# Patient Record
Sex: MALE | Race: WHITE | NOT HISPANIC OR LATINO | Employment: OTHER | URBAN - METROPOLITAN AREA
[De-identification: names, ages, dates, MRNs, and addresses within clinical notes are randomized per-mention and may not be internally consistent; named-entity substitution may affect disease eponyms.]

---

## 2017-03-16 ENCOUNTER — HISTORICAL (OUTPATIENT)
Dept: HEMATOLOGY/ONCOLOGY | Facility: CLINIC | Age: 67
End: 2017-03-16

## 2017-06-27 ENCOUNTER — HISTORICAL (OUTPATIENT)
Dept: ENDOSCOPY | Facility: HOSPITAL | Age: 67
End: 2017-06-27

## 2017-06-27 LAB — CRC RECOMMENDATION EXT: NORMAL

## 2017-09-19 ENCOUNTER — HISTORICAL (OUTPATIENT)
Dept: HEMATOLOGY/ONCOLOGY | Facility: CLINIC | Age: 67
End: 2017-09-19

## 2017-09-19 LAB
ABS NEUT (OLG): 3.39 X10(3)/MCL (ref 2.1–9.2)
ALBUMIN SERPL-MCNC: 3.7 GM/DL (ref 3.4–5)
ALBUMIN/GLOB SERPL: 0.9 RATIO (ref 1.1–2)
ALP SERPL-CCNC: 43 UNIT/L (ref 50–136)
ALT SERPL-CCNC: 28 UNIT/L (ref 12–78)
AST SERPL-CCNC: 21 UNIT/L (ref 15–37)
BASOPHILS # BLD AUTO: 0 X10(3)/MCL (ref 0–0.2)
BASOPHILS NFR BLD AUTO: 0.5 %
BILIRUB SERPL-MCNC: 0.5 MG/DL (ref 0.2–1)
BILIRUBIN DIRECT+TOT PNL SERPL-MCNC: 0.1 MG/DL (ref 0–0.5)
BILIRUBIN DIRECT+TOT PNL SERPL-MCNC: 0.4 MG/DL (ref 0–0.8)
BUN SERPL-MCNC: 25 MG/DL (ref 7–18)
CALCIUM SERPL-MCNC: 10.1 MG/DL (ref 8.5–10.1)
CHLORIDE SERPL-SCNC: 108 MMOL/L (ref 98–107)
CO2 SERPL-SCNC: 24 MMOL/L (ref 21–32)
CREAT SERPL-MCNC: 1.21 MG/DL (ref 0.7–1.3)
EOSINOPHIL # BLD AUTO: 0.1 X10(3)/MCL (ref 0–0.9)
EOSINOPHIL NFR BLD AUTO: 2 %
ERYTHROCYTE [DISTWIDTH] IN BLOOD BY AUTOMATED COUNT: 14 % (ref 11.5–17)
GLOBULIN SER-MCNC: 4 GM/DL (ref 2.4–3.5)
GLUCOSE SERPL-MCNC: 78 MG/DL (ref 74–106)
HCT VFR BLD AUTO: 39.3 % (ref 42–52)
HGB BLD-MCNC: 12.5 GM/DL (ref 14–18)
LYMPHOCYTES # BLD AUTO: 1.6 X10(3)/MCL (ref 0.6–4.6)
LYMPHOCYTES NFR BLD AUTO: 28 %
MCH RBC QN AUTO: 29.6 PG (ref 27–31)
MCHC RBC AUTO-ENTMCNC: 31.8 GM/DL (ref 33–36)
MCV RBC AUTO: 93.1 FL (ref 80–94)
MONOCYTES # BLD AUTO: 0.5 X10(3)/MCL (ref 0.1–1.3)
MONOCYTES NFR BLD AUTO: 8.3 %
NEUTROPHILS # BLD AUTO: 3.4 X10(3)/MCL (ref 2.1–9.2)
NEUTROPHILS NFR BLD AUTO: 61.2 %
PLATELET # BLD AUTO: 192 X10(3)/MCL (ref 130–400)
PMV BLD AUTO: 9.7 FL (ref 9.4–12.4)
POTASSIUM SERPL-SCNC: 5 MMOL/L (ref 3.5–5.1)
PROT SERPL-MCNC: 7.6 GM/DL
PROT SERPL-MCNC: 7.7 GM/DL (ref 6.4–8.2)
RBC # BLD AUTO: 4.22 X10(6)/MCL (ref 4.7–6.1)
SODIUM SERPL-SCNC: 143 MMOL/L (ref 136–145)
WBC # SPEC AUTO: 5.5 X10(3)/MCL (ref 4.5–11.5)

## 2017-10-06 ENCOUNTER — HISTORICAL (OUTPATIENT)
Dept: RADIOLOGY | Facility: HOSPITAL | Age: 67
End: 2017-10-06

## 2017-11-09 ENCOUNTER — HISTORICAL (OUTPATIENT)
Dept: RADIOLOGY | Facility: HOSPITAL | Age: 67
End: 2017-11-09

## 2017-11-09 LAB — POC CREATININE: 1.1 MG/DL (ref 0.6–1.3)

## 2017-12-22 ENCOUNTER — HISTORICAL (OUTPATIENT)
Dept: HEMATOLOGY/ONCOLOGY | Facility: CLINIC | Age: 67
End: 2017-12-22

## 2017-12-22 LAB
ABS NEUT (OLG): 3.21 X10(3)/MCL (ref 2.1–9.2)
ALBUMIN SERPL-MCNC: 3.8 GM/DL (ref 3.4–5)
ALBUMIN/GLOB SERPL: 1 {RATIO}
ALP SERPL-CCNC: 41 UNIT/L (ref 50–136)
ALT SERPL-CCNC: 26 UNIT/L (ref 12–78)
AST SERPL-CCNC: 16 UNIT/L (ref 15–37)
BASOPHILS # BLD AUTO: 0 X10(3)/MCL (ref 0–0.2)
BASOPHILS NFR BLD AUTO: 0.5 %
BILIRUB SERPL-MCNC: 0.4 MG/DL (ref 0.2–1)
BILIRUBIN DIRECT+TOT PNL SERPL-MCNC: 0.1 MG/DL (ref 0–0.2)
BILIRUBIN DIRECT+TOT PNL SERPL-MCNC: 0.3 MG/DL (ref 0–0.8)
BUN SERPL-MCNC: 27 MG/DL (ref 7–18)
CALCIUM SERPL-MCNC: 9.7 MG/DL (ref 8.5–10.1)
CHLORIDE SERPL-SCNC: 106 MMOL/L (ref 98–107)
CO2 SERPL-SCNC: 28 MMOL/L (ref 21–32)
CREAT SERPL-MCNC: 1.15 MG/DL (ref 0.7–1.3)
EOSINOPHIL # BLD AUTO: 0.1 X10(3)/MCL (ref 0–0.9)
EOSINOPHIL NFR BLD AUTO: 2.4 %
ERYTHROCYTE [DISTWIDTH] IN BLOOD BY AUTOMATED COUNT: 13.3 % (ref 11.5–17)
GLOBULIN SER-MCNC: 4 GM/DL (ref 2.4–3.5)
GLUCOSE SERPL-MCNC: 75 MG/DL (ref 74–106)
HCT VFR BLD AUTO: 38 % (ref 42–52)
HGB BLD-MCNC: 12.2 GM/DL (ref 14–18)
LYMPHOCYTES # BLD AUTO: 1.8 X10(3)/MCL (ref 0.6–4.6)
LYMPHOCYTES NFR BLD AUTO: 31.8 %
MCH RBC QN AUTO: 30 PG (ref 27–31)
MCHC RBC AUTO-ENTMCNC: 32.1 GM/DL (ref 33–36)
MCV RBC AUTO: 93.4 FL (ref 80–94)
MONOCYTES # BLD AUTO: 0.5 X10(3)/MCL (ref 0.1–1.3)
MONOCYTES NFR BLD AUTO: 9.1 %
NEUTROPHILS # BLD AUTO: 3.2 X10(3)/MCL (ref 2.1–9.2)
NEUTROPHILS NFR BLD AUTO: 56.2 %
PLATELET # BLD AUTO: 194 X10(3)/MCL (ref 130–400)
PMV BLD AUTO: 9.1 FL (ref 9.4–12.4)
POTASSIUM SERPL-SCNC: 4.9 MMOL/L (ref 3.5–5.1)
PROT SERPL-MCNC: 7.4 GM/DL
PROT SERPL-MCNC: 7.8 GM/DL (ref 6.4–8.2)
RBC # BLD AUTO: 4.07 X10(6)/MCL (ref 4.7–6.1)
SODIUM SERPL-SCNC: 141 MMOL/L (ref 136–145)
WBC # SPEC AUTO: 5.7 X10(3)/MCL (ref 4.5–11.5)

## 2018-05-15 ENCOUNTER — HISTORICAL (OUTPATIENT)
Dept: HEMATOLOGY/ONCOLOGY | Facility: CLINIC | Age: 68
End: 2018-05-15

## 2018-05-15 LAB
ABS NEUT (OLG): 3.41 X10(3)/MCL (ref 2.1–9.2)
ALBUMIN SERPL-MCNC: 3.6 GM/DL (ref 3.4–5)
ALBUMIN/GLOB SERPL: 1 RATIO (ref 1.1–2)
ALP SERPL-CCNC: 49 UNIT/L (ref 50–136)
ALT SERPL-CCNC: 29 UNIT/L (ref 12–78)
AST SERPL-CCNC: 20 UNIT/L (ref 15–37)
BASOPHILS # BLD AUTO: 0.1 X10(3)/MCL (ref 0–0.2)
BASOPHILS NFR BLD AUTO: 1.1 %
BILIRUB SERPL-MCNC: 0.5 MG/DL (ref 0.2–1)
BILIRUBIN DIRECT+TOT PNL SERPL-MCNC: 0.1 MG/DL (ref 0–0.5)
BILIRUBIN DIRECT+TOT PNL SERPL-MCNC: 0.4 MG/DL (ref 0–0.8)
BUN SERPL-MCNC: 23 MG/DL (ref 7–18)
CALCIUM SERPL-MCNC: 9.8 MG/DL (ref 8.5–10.1)
CHLORIDE SERPL-SCNC: 104 MMOL/L (ref 98–107)
CO2 SERPL-SCNC: 26 MMOL/L (ref 21–32)
CREAT SERPL-MCNC: 1.02 MG/DL (ref 0.7–1.3)
EOSINOPHIL # BLD AUTO: 0.2 X10(3)/MCL (ref 0–0.9)
EOSINOPHIL NFR BLD AUTO: 2.8 %
ERYTHROCYTE [DISTWIDTH] IN BLOOD BY AUTOMATED COUNT: 13.5 % (ref 11.5–17)
GLOBULIN SER-MCNC: 3.5 GM/DL (ref 2.4–3.5)
GLUCOSE SERPL-MCNC: 77 MG/DL (ref 74–106)
HCT VFR BLD AUTO: 35.7 % (ref 42–52)
HGB BLD-MCNC: 11.6 GM/DL (ref 14–18)
LYMPHOCYTES # BLD AUTO: 1.6 X10(3)/MCL (ref 0.6–4.6)
LYMPHOCYTES NFR BLD AUTO: 27.7 %
MCH RBC QN AUTO: 30.2 PG (ref 27–31)
MCHC RBC AUTO-ENTMCNC: 32.5 GM/DL (ref 33–36)
MCV RBC AUTO: 93 FL (ref 80–94)
MONOCYTES # BLD AUTO: 0.4 X10(3)/MCL (ref 0.1–1.3)
MONOCYTES NFR BLD AUTO: 7.8 %
NEUTROPHILS # BLD AUTO: 3.4 X10(3)/MCL (ref 2.1–9.2)
NEUTROPHILS NFR BLD AUTO: 60.6 %
PLATELET # BLD AUTO: 205 X10(3)/MCL (ref 130–400)
PMV BLD AUTO: 9.3 FL (ref 9.4–12.4)
POTASSIUM SERPL-SCNC: 4.5 MMOL/L (ref 3.5–5.1)
PROT SERPL-MCNC: 7.1 G/DL
PROT SERPL-MCNC: 7.1 GM/DL (ref 6.4–8.2)
RBC # BLD AUTO: 3.84 X10(6)/MCL (ref 4.7–6.1)
SODIUM SERPL-SCNC: 138 MMOL/L (ref 136–145)
WBC # SPEC AUTO: 5.6 X10(3)/MCL (ref 4.5–11.5)

## 2018-05-24 ENCOUNTER — HISTORICAL (OUTPATIENT)
Dept: ADMINISTRATIVE | Facility: HOSPITAL | Age: 68
End: 2018-05-24

## 2018-05-24 LAB
ABS NEUT (OLG): 3.22 X10(3)/MCL (ref 2.1–9.2)
ALBUMIN SERPL-MCNC: 3.6 GM/DL (ref 3.4–5)
ALBUMIN/GLOB SERPL: 1 RATIO (ref 1.1–2)
ALP SERPL-CCNC: 48 UNIT/L (ref 50–136)
ALT SERPL-CCNC: 25 UNIT/L (ref 12–78)
AST SERPL-CCNC: 16 UNIT/L (ref 15–37)
BASOPHILS # BLD AUTO: 0 X10(3)/MCL (ref 0–0.2)
BASOPHILS NFR BLD AUTO: 0.8 %
BILIRUB SERPL-MCNC: 0.3 MG/DL (ref 0.2–1)
BILIRUBIN DIRECT+TOT PNL SERPL-MCNC: 0.1 MG/DL (ref 0–0.5)
BILIRUBIN DIRECT+TOT PNL SERPL-MCNC: 0.2 MG/DL (ref 0–0.8)
BUN SERPL-MCNC: 34 MG/DL (ref 7–18)
CALCIUM SERPL-MCNC: 9.5 MG/DL (ref 8.5–10.1)
CHLORIDE SERPL-SCNC: 107 MMOL/L (ref 98–107)
CO2 SERPL-SCNC: 23 MMOL/L (ref 21–32)
CREAT SERPL-MCNC: 1.64 MG/DL (ref 0.7–1.3)
EOSINOPHIL # BLD AUTO: 0.1 X10(3)/MCL (ref 0–0.9)
EOSINOPHIL NFR BLD AUTO: 1.7 %
ERYTHROCYTE [DISTWIDTH] IN BLOOD BY AUTOMATED COUNT: 13.7 % (ref 11.5–17)
FERRITIN SERPL-MCNC: 88 NG/ML (ref 8–388)
FOLATE SERPL-MCNC: 3.6 NG/ML (ref 3.1–17.5)
GLOBULIN SER-MCNC: 3.7 GM/DL (ref 2.4–3.5)
GLUCOSE SERPL-MCNC: 89 MG/DL (ref 74–106)
HCT VFR BLD AUTO: 36.3 % (ref 42–52)
HGB BLD-MCNC: 11.7 GM/DL (ref 14–18)
IRON SATN MFR SERPL: 23.7 % (ref 20–50)
IRON SERPL-MCNC: 89 MCG/DL (ref 50–175)
LYMPHOCYTES # BLD AUTO: 1.5 X10(3)/MCL (ref 0.6–4.6)
LYMPHOCYTES NFR BLD AUTO: 28.9 %
MCH RBC QN AUTO: 30.5 PG (ref 27–31)
MCHC RBC AUTO-ENTMCNC: 32.2 GM/DL (ref 33–36)
MCV RBC AUTO: 94.8 FL (ref 80–94)
MONOCYTES # BLD AUTO: 0.4 X10(3)/MCL (ref 0.1–1.3)
MONOCYTES NFR BLD AUTO: 8.1 %
NEUTROPHILS # BLD AUTO: 3.2 X10(3)/MCL (ref 2.1–9.2)
NEUTROPHILS NFR BLD AUTO: 60.5 %
PLATELET # BLD AUTO: 210 X10(3)/MCL (ref 130–400)
PMV BLD AUTO: 9.8 FL (ref 9.4–12.4)
POTASSIUM SERPL-SCNC: 5 MMOL/L (ref 3.5–5.1)
PROT SERPL-MCNC: 7.3 GM/DL (ref 6.4–8.2)
RBC # BLD AUTO: 3.83 X10(6)/MCL (ref 4.7–6.1)
RET# (OHS): 0.07 X10^6/ML (ref 0.03–0.1)
RETICULOCYTE COUNT AUTOMATED (OLG): 2 % (ref 1.1–2.1)
SODIUM SERPL-SCNC: 140 MMOL/L (ref 136–145)
TIBC SERPL-MCNC: 375 MCG/DL (ref 250–450)
TRANSFERRIN SERPL-MCNC: 315 MG/DL (ref 200–360)
VIT B12 SERPL-MCNC: 310 PG/ML (ref 193–986)
WBC # SPEC AUTO: 5.3 X10(3)/MCL (ref 4.5–11.5)

## 2018-06-11 ENCOUNTER — HISTORICAL (OUTPATIENT)
Dept: LAB | Facility: HOSPITAL | Age: 68
End: 2018-06-11

## 2018-06-11 ENCOUNTER — HISTORICAL (OUTPATIENT)
Dept: ADMINISTRATIVE | Facility: HOSPITAL | Age: 68
End: 2018-06-11

## 2018-06-11 LAB
ABS NEUT (OLG): 3.78 X10(3)/MCL (ref 2.1–9.2)
BASOPHILS # BLD AUTO: 0 X10(3)/MCL (ref 0–0.2)
BASOPHILS NFR BLD AUTO: 1 %
EOSINOPHIL # BLD AUTO: 0.1 X10(3)/MCL (ref 0–0.9)
EOSINOPHIL NFR BLD AUTO: 2 %
ERYTHROCYTE [DISTWIDTH] IN BLOOD BY AUTOMATED COUNT: 13.3 % (ref 11.5–17)
HCT VFR BLD AUTO: 37.4 % (ref 42–52)
HGB BLD-MCNC: 11.7 GM/DL (ref 14–18)
LYMPHOCYTES # BLD AUTO: 1.4 X10(3)/MCL (ref 0.6–4.6)
LYMPHOCYTES NFR BLD AUTO: 24 %
MCH RBC QN AUTO: 29.9 PG (ref 27–31)
MCHC RBC AUTO-ENTMCNC: 31.3 GM/DL (ref 33–36)
MCV RBC AUTO: 95.7 FL (ref 80–94)
MONOCYTES # BLD AUTO: 0.4 X10(3)/MCL (ref 0.1–1.3)
MONOCYTES NFR BLD AUTO: 7 %
NEUTROPHILS # BLD AUTO: 3.78 X10(3)/MCL (ref 2.1–9.2)
NEUTROPHILS NFR BLD AUTO: 66 %
PLATELET # BLD AUTO: 177 X10(3)/MCL (ref 130–400)
PMV BLD AUTO: 10.2 FL (ref 9.4–12.4)
RBC # BLD AUTO: 3.91 X10(6)/MCL (ref 4.7–6.1)
WBC # SPEC AUTO: 5.7 X10(3)/MCL (ref 4.5–11.5)

## 2018-09-25 ENCOUNTER — HISTORICAL (OUTPATIENT)
Dept: HEMATOLOGY/ONCOLOGY | Facility: CLINIC | Age: 68
End: 2018-09-25

## 2018-09-25 LAB
ABS NEUT (OLG): 3.96 X10(3)/MCL (ref 2.1–9.2)
ALBUMIN SERPL-MCNC: 3.7 GM/DL (ref 3.4–5)
ALBUMIN/GLOB SERPL: 0.9 {RATIO}
ALP SERPL-CCNC: 37 UNIT/L (ref 50–136)
ALT SERPL-CCNC: 27 UNIT/L (ref 12–78)
AST SERPL-CCNC: 19 UNIT/L (ref 15–37)
BASOPHILS # BLD AUTO: 0 X10(3)/MCL (ref 0–0.2)
BASOPHILS NFR BLD AUTO: 0.6 %
BILIRUB SERPL-MCNC: 0.3 MG/DL (ref 0.2–1)
BILIRUBIN DIRECT+TOT PNL SERPL-MCNC: 0.1 MG/DL (ref 0–0.2)
BILIRUBIN DIRECT+TOT PNL SERPL-MCNC: 0.2 MG/DL (ref 0–0.8)
BUN SERPL-MCNC: 31 MG/DL (ref 7–18)
CALCIUM SERPL-MCNC: 9.6 MG/DL (ref 8.5–10.1)
CHLORIDE SERPL-SCNC: 108 MMOL/L (ref 98–107)
CO2 SERPL-SCNC: 24 MMOL/L (ref 21–32)
CREAT SERPL-MCNC: 1.37 MG/DL (ref 0.7–1.3)
EOSINOPHIL # BLD AUTO: 0.1 X10(3)/MCL (ref 0–0.9)
EOSINOPHIL NFR BLD AUTO: 1.6 %
ERYTHROCYTE [DISTWIDTH] IN BLOOD BY AUTOMATED COUNT: 13.5 % (ref 11.5–17)
GLOBULIN SER-MCNC: 4 GM/DL (ref 2.4–3.5)
GLUCOSE SERPL-MCNC: 79 MG/DL (ref 74–106)
HCT VFR BLD AUTO: 38.2 % (ref 42–52)
HGB BLD-MCNC: 12.3 GM/DL (ref 14–18)
LYMPHOCYTES # BLD AUTO: 1.8 X10(3)/MCL (ref 0.6–4.6)
LYMPHOCYTES NFR BLD AUTO: 28 %
MCH RBC QN AUTO: 30.5 PG (ref 27–31)
MCHC RBC AUTO-ENTMCNC: 32.2 GM/DL (ref 33–36)
MCV RBC AUTO: 94.8 FL (ref 80–94)
MONOCYTES # BLD AUTO: 0.5 X10(3)/MCL (ref 0.1–1.3)
MONOCYTES NFR BLD AUTO: 7.3 %
NEUTROPHILS # BLD AUTO: 4 X10(3)/MCL (ref 2.1–9.2)
NEUTROPHILS NFR BLD AUTO: 62.5 %
PLATELET # BLD AUTO: 218 X10(3)/MCL (ref 130–400)
PMV BLD AUTO: 10.3 FL (ref 9.4–12.4)
POTASSIUM SERPL-SCNC: 5 MMOL/L (ref 3.5–5.1)
PROT SERPL-MCNC: 7.7 G/DL
PROT SERPL-MCNC: 7.7 GM/DL (ref 6.4–8.2)
RBC # BLD AUTO: 4.03 X10(6)/MCL (ref 4.7–6.1)
SODIUM SERPL-SCNC: 142 MMOL/L (ref 136–145)
WBC # SPEC AUTO: 6.3 X10(3)/MCL (ref 4.5–11.5)

## 2019-01-28 ENCOUNTER — HISTORICAL (OUTPATIENT)
Dept: ADMINISTRATIVE | Facility: HOSPITAL | Age: 69
End: 2019-01-28

## 2019-02-04 ENCOUNTER — HISTORICAL (OUTPATIENT)
Dept: HEMATOLOGY/ONCOLOGY | Facility: CLINIC | Age: 69
End: 2019-02-04

## 2019-02-04 LAB
ABS NEUT (OLG): 3.87 X10(3)/MCL (ref 2.1–9.2)
ALBUMIN SERPL-MCNC: 3.5 GM/DL (ref 3.4–5)
ALBUMIN/GLOB SERPL: 1 {RATIO}
ALP SERPL-CCNC: 41 UNIT/L (ref 50–136)
ALT SERPL-CCNC: 23 UNIT/L (ref 12–78)
AST SERPL-CCNC: 16 UNIT/L (ref 15–37)
BASOPHILS # BLD AUTO: 0 X10(3)/MCL (ref 0–0.2)
BASOPHILS NFR BLD AUTO: 0.8 %
BILIRUB SERPL-MCNC: 0.5 MG/DL (ref 0.2–1)
BILIRUBIN DIRECT+TOT PNL SERPL-MCNC: 0.1 MG/DL (ref 0–0.2)
BILIRUBIN DIRECT+TOT PNL SERPL-MCNC: 0.4 MG/DL (ref 0–0.8)
BUN SERPL-MCNC: 22 MG/DL (ref 7–18)
CALCIUM SERPL-MCNC: 9.5 MG/DL (ref 8.5–10.1)
CHLORIDE SERPL-SCNC: 106 MMOL/L (ref 98–107)
CO2 SERPL-SCNC: 27 MMOL/L (ref 21–32)
CREAT SERPL-MCNC: 1.18 MG/DL (ref 0.7–1.3)
EOSINOPHIL # BLD AUTO: 0.1 X10(3)/MCL (ref 0–0.9)
EOSINOPHIL NFR BLD AUTO: 2 %
ERYTHROCYTE [DISTWIDTH] IN BLOOD BY AUTOMATED COUNT: 13.1 % (ref 11.5–17)
FERRITIN SERPL-MCNC: 91.7 NG/ML (ref 8–388)
FOLATE SERPL-MCNC: 10.3 NG/ML (ref 3.1–17.5)
GLOBULIN SER-MCNC: 3.6 GM/DL (ref 2.4–3.5)
GLUCOSE SERPL-MCNC: 75 MG/DL (ref 74–106)
HCT VFR BLD AUTO: 38.4 % (ref 42–52)
HGB BLD-MCNC: 12 GM/DL (ref 14–18)
IRON SATN MFR SERPL: 22.4 % (ref 20–50)
IRON SERPL-MCNC: 88 MCG/DL (ref 50–175)
LYMPHOCYTES # BLD AUTO: 1.4 X10(3)/MCL (ref 0.6–4.6)
LYMPHOCYTES NFR BLD AUTO: 23.8 %
MCH RBC QN AUTO: 29.5 PG (ref 27–31)
MCHC RBC AUTO-ENTMCNC: 31.3 GM/DL (ref 33–36)
MCV RBC AUTO: 94.3 FL (ref 80–94)
MONOCYTES # BLD AUTO: 0.5 X10(3)/MCL (ref 0.1–1.3)
MONOCYTES NFR BLD AUTO: 8.9 %
NEUTROPHILS # BLD AUTO: 3.9 X10(3)/MCL (ref 2.1–9.2)
NEUTROPHILS NFR BLD AUTO: 64.2 %
PLATELET # BLD AUTO: 208 X10(3)/MCL (ref 130–400)
PMV BLD AUTO: 9.9 FL (ref 9.4–12.4)
POTASSIUM SERPL-SCNC: 4.9 MMOL/L (ref 3.5–5.1)
PROT SERPL-MCNC: 7.1 GM/DL
PROT SERPL-MCNC: 7.1 GM/DL (ref 6.4–8.2)
RBC # BLD AUTO: 4.07 X10(6)/MCL (ref 4.7–6.1)
SODIUM SERPL-SCNC: 140 MMOL/L (ref 136–145)
TIBC SERPL-MCNC: 392 MCG/DL (ref 250–450)
TRANSFERRIN SERPL-MCNC: 298 MG/DL (ref 200–360)
VIT B12 SERPL-MCNC: 302 PG/ML (ref 193–986)
WBC # SPEC AUTO: 6 X10(3)/MCL (ref 4.5–11.5)

## 2019-08-05 ENCOUNTER — HISTORICAL (OUTPATIENT)
Dept: HEMATOLOGY/ONCOLOGY | Facility: CLINIC | Age: 69
End: 2019-08-05

## 2019-08-05 LAB
ABS NEUT (OLG): 2.96 X10(3)/MCL (ref 2.1–9.2)
ALBUMIN SERPL-MCNC: 3.6 GM/DL (ref 3.4–5)
ALBUMIN/GLOB SERPL: 0.9 RATIO (ref 1.1–2)
ALP SERPL-CCNC: 42 UNIT/L (ref 50–136)
ALT SERPL-CCNC: 21 UNIT/L (ref 12–78)
AST SERPL-CCNC: 17 UNIT/L (ref 15–37)
BASOPHILS # BLD AUTO: 0 X10(3)/MCL (ref 0–0.2)
BASOPHILS NFR BLD AUTO: 0.8 %
BILIRUB SERPL-MCNC: 0.4 MG/DL (ref 0.2–1)
BILIRUBIN DIRECT+TOT PNL SERPL-MCNC: 0.1 MG/DL (ref 0–0.5)
BILIRUBIN DIRECT+TOT PNL SERPL-MCNC: 0.3 MG/DL (ref 0–0.8)
BUN SERPL-MCNC: 32 MG/DL (ref 7–18)
CALCIUM SERPL-MCNC: 9.9 MG/DL (ref 8.5–10.1)
CHLORIDE SERPL-SCNC: 108 MMOL/L (ref 98–107)
CO2 SERPL-SCNC: 26 MMOL/L (ref 21–32)
CREAT SERPL-MCNC: 1.34 MG/DL (ref 0.7–1.3)
EOSINOPHIL # BLD AUTO: 0.1 X10(3)/MCL (ref 0–0.9)
EOSINOPHIL NFR BLD AUTO: 2.2 %
ERYTHROCYTE [DISTWIDTH] IN BLOOD BY AUTOMATED COUNT: 13 % (ref 11.5–17)
GLOBULIN SER-MCNC: 3.9 GM/DL (ref 2.4–3.5)
GLUCOSE SERPL-MCNC: 78 MG/DL (ref 74–106)
HCT VFR BLD AUTO: 38.1 % (ref 42–52)
HGB BLD-MCNC: 12.1 GM/DL (ref 14–18)
LYMPHOCYTES # BLD AUTO: 1.5 X10(3)/MCL (ref 0.6–4.6)
LYMPHOCYTES NFR BLD AUTO: 29.4 %
MCH RBC QN AUTO: 29.8 PG (ref 27–31)
MCHC RBC AUTO-ENTMCNC: 31.8 GM/DL (ref 33–36)
MCV RBC AUTO: 93.8 FL (ref 80–94)
MONOCYTES # BLD AUTO: 0.4 X10(3)/MCL (ref 0.1–1.3)
MONOCYTES NFR BLD AUTO: 8.4 %
NEUTROPHILS # BLD AUTO: 3 X10(3)/MCL (ref 2.1–9.2)
NEUTROPHILS NFR BLD AUTO: 59.2 %
PLATELET # BLD AUTO: 207 X10(3)/MCL (ref 130–400)
PMV BLD AUTO: 9.6 FL (ref 9.4–12.4)
POTASSIUM SERPL-SCNC: 5 MMOL/L (ref 3.5–5.1)
PROT SERPL-MCNC: 7.3 GM/DL
PROT SERPL-MCNC: 7.5 GM/DL (ref 6.4–8.2)
RBC # BLD AUTO: 4.06 X10(6)/MCL (ref 4.7–6.1)
SODIUM SERPL-SCNC: 141 MMOL/L (ref 136–145)
WBC # SPEC AUTO: 5 X10(3)/MCL (ref 4.5–11.5)

## 2019-12-08 ENCOUNTER — HISTORICAL (OUTPATIENT)
Dept: ADMINISTRATIVE | Facility: HOSPITAL | Age: 69
End: 2019-12-08

## 2019-12-11 LAB — FINAL CULTURE: NORMAL

## 2020-02-03 ENCOUNTER — HISTORICAL (OUTPATIENT)
Dept: HEMATOLOGY/ONCOLOGY | Facility: CLINIC | Age: 70
End: 2020-02-03

## 2020-02-03 LAB
ABS NEUT (OLG): 4.11 X10(3)/MCL (ref 2.1–9.2)
ALBUMIN SERPL-MCNC: 3.7 GM/DL (ref 3.4–5)
ALBUMIN/GLOB SERPL: 1 {RATIO}
ALP SERPL-CCNC: 62 UNIT/L (ref 50–136)
ALT SERPL-CCNC: 28 UNIT/L (ref 12–78)
AST SERPL-CCNC: 18 UNIT/L (ref 15–37)
B2 MICROGLOB SERPL-MCNC: 3.98 MG/L (ref 0.7–1.8)
BASOPHILS # BLD AUTO: 0 X10(3)/MCL (ref 0–0.2)
BASOPHILS NFR BLD AUTO: 0.8 %
BILIRUB SERPL-MCNC: 0.4 MG/DL (ref 0.2–1)
BILIRUBIN DIRECT+TOT PNL SERPL-MCNC: 0.1 MG/DL (ref 0–0.2)
BILIRUBIN DIRECT+TOT PNL SERPL-MCNC: 0.3 MG/DL (ref 0–0.8)
BUN SERPL-MCNC: 16 MG/DL (ref 7–18)
CALCIUM SERPL-MCNC: 10 MG/DL (ref 8.5–10.1)
CHLORIDE SERPL-SCNC: 105 MMOL/L (ref 98–107)
CO2 SERPL-SCNC: 26 MMOL/L (ref 21–32)
CREAT SERPL-MCNC: 1.11 MG/DL (ref 0.7–1.3)
EOSINOPHIL # BLD AUTO: 0.1 X10(3)/MCL (ref 0–0.9)
EOSINOPHIL NFR BLD AUTO: 1.2 %
ERYTHROCYTE [DISTWIDTH] IN BLOOD BY AUTOMATED COUNT: 13.9 % (ref 11.5–17)
FERRITIN SERPL-MCNC: 72.1 NG/ML (ref 8–388)
GLOBULIN SER-MCNC: 3.6 GM/DL (ref 2.4–3.5)
GLUCOSE SERPL-MCNC: 96 MG/DL (ref 74–106)
HCT VFR BLD AUTO: 37.8 % (ref 42–52)
HGB BLD-MCNC: 12 GM/DL (ref 14–18)
IRON SATN MFR SERPL: 12.5 % (ref 20–50)
IRON SERPL-MCNC: 44 MCG/DL (ref 50–175)
LYMPHOCYTES # BLD AUTO: 1.3 X10(3)/MCL (ref 0.6–4.6)
LYMPHOCYTES NFR BLD AUTO: 22.4 %
MCH RBC QN AUTO: 28.5 PG (ref 27–31)
MCHC RBC AUTO-ENTMCNC: 31.7 GM/DL (ref 33–36)
MCV RBC AUTO: 89.8 FL (ref 80–94)
MONOCYTES # BLD AUTO: 0.4 X10(3)/MCL (ref 0.1–1.3)
MONOCYTES NFR BLD AUTO: 6.1 %
NEUTROPHILS # BLD AUTO: 4.1 X10(3)/MCL (ref 2.1–9.2)
NEUTROPHILS NFR BLD AUTO: 69.3 %
PLATELET # BLD AUTO: 237 X10(3)/MCL (ref 130–400)
PMV BLD AUTO: 10 FL (ref 9.4–12.4)
POTASSIUM SERPL-SCNC: 4.3 MMOL/L (ref 3.5–5.1)
PROT SERPL-MCNC: 7.3 GM/DL
PROT SERPL-MCNC: 7.3 GM/DL (ref 6.4–8.2)
RBC # BLD AUTO: 4.21 X10(6)/MCL (ref 4.7–6.1)
RET# (OHS): 0.07 X10^6/ML (ref 0.03–0.1)
RETICULOCYTE COUNT AUTOMATED (OLG): 1.7 % (ref 1.1–2.1)
SODIUM SERPL-SCNC: 138 MMOL/L (ref 136–145)
TIBC SERPL-MCNC: 351 MCG/DL (ref 250–450)
TRANSFERRIN SERPL-MCNC: 284 MG/DL (ref 200–360)
VIT B12 SERPL-MCNC: 235 PG/ML (ref 193–986)
WBC # SPEC AUTO: 5.9 X10(3)/MCL (ref 4.5–11.5)

## 2020-05-04 ENCOUNTER — HISTORICAL (OUTPATIENT)
Dept: HEMATOLOGY/ONCOLOGY | Facility: CLINIC | Age: 70
End: 2020-05-04

## 2020-05-04 LAB
ABS NEUT (OLG): 3.27 X10(3)/MCL (ref 2.1–9.2)
ALBUMIN SERPL-MCNC: 3.7 GM/DL (ref 3.4–4.8)
ALBUMIN/GLOB SERPL: 1 RATIO (ref 1.1–2)
ALP SERPL-CCNC: 48 UNIT/L (ref 40–150)
ALT SERPL-CCNC: 17 UNIT/L (ref 0–55)
AST SERPL-CCNC: 18 UNIT/L (ref 5–34)
B2 MICROGLOB SERPL-MCNC: 3.14 MG/L
BASOPHILS # BLD AUTO: 0 X10(3)/MCL (ref 0–0.2)
BASOPHILS NFR BLD AUTO: 1 %
BILIRUB SERPL-MCNC: 0.5 MG/DL
BILIRUBIN DIRECT+TOT PNL SERPL-MCNC: 0.2 MG/DL (ref 0–0.5)
BILIRUBIN DIRECT+TOT PNL SERPL-MCNC: 0.3 MG/DL (ref 0–0.8)
BUN SERPL-MCNC: 20 MG/DL (ref 8.4–25.7)
CALCIUM SERPL-MCNC: 9.8 MG/DL (ref 8.8–10)
CHLORIDE SERPL-SCNC: 107 MMOL/L (ref 98–107)
CO2 SERPL-SCNC: 24 MMOL/L (ref 23–31)
CREAT SERPL-MCNC: 1.14 MG/DL (ref 0.73–1.18)
EOSINOPHIL # BLD AUTO: 0.1 X10(3)/MCL (ref 0–0.9)
EOSINOPHIL NFR BLD AUTO: 2.1 %
ERYTHROCYTE [DISTWIDTH] IN BLOOD BY AUTOMATED COUNT: 14.2 % (ref 11.5–17)
FERRITIN SERPL-MCNC: 49.67 NG/ML (ref 21.81–274.66)
FOLATE SERPL-MCNC: 3.9 NG/ML (ref 7–31.4)
GLOBULIN SER-MCNC: 3.8 GM/DL (ref 2.4–3.5)
GLUCOSE SERPL-MCNC: 80 MG/DL (ref 82–115)
HCT VFR BLD AUTO: 40.6 % (ref 42–52)
HGB BLD-MCNC: 12.7 GM/DL (ref 14–18)
IRON SATN MFR SERPL: 17 % (ref 20–50)
IRON SERPL-MCNC: 54 UG/DL (ref 65–175)
LYMPHOCYTES # BLD AUTO: 1.4 X10(3)/MCL (ref 0.6–4.6)
LYMPHOCYTES NFR BLD AUTO: 26.5 %
MCH RBC QN AUTO: 28.1 PG (ref 27–31)
MCHC RBC AUTO-ENTMCNC: 31.3 GM/DL (ref 33–36)
MCV RBC AUTO: 89.8 FL (ref 80–94)
MONOCYTES # BLD AUTO: 0.4 X10(3)/MCL (ref 0.1–1.3)
MONOCYTES NFR BLD AUTO: 7.5 %
NEUTROPHILS # BLD AUTO: 3.3 X10(3)/MCL (ref 2.1–9.2)
NEUTROPHILS NFR BLD AUTO: 62.7 %
PLATELET # BLD AUTO: 209 X10(3)/MCL (ref 130–400)
PMV BLD AUTO: 9.6 FL (ref 9.4–12.4)
POTASSIUM SERPL-SCNC: 4.5 MMOL/L (ref 3.5–5.1)
PROT SERPL-MCNC: 7.2 GM/DL
PROT SERPL-MCNC: 7.5 GM/DL (ref 5.8–7.6)
RBC # BLD AUTO: 4.52 X10(6)/MCL (ref 4.7–6.1)
SODIUM SERPL-SCNC: 141 MMOL/L (ref 136–145)
TIBC SERPL-MCNC: 265 UG/DL (ref 69–240)
TIBC SERPL-MCNC: 319 UG/DL (ref 250–450)
TRANSFERRIN SERPL-MCNC: 287 MG/DL (ref 163–344)
VIT B12 SERPL-MCNC: 229 PG/ML (ref 213–816)
WBC # SPEC AUTO: 5.2 X10(3)/MCL (ref 4.5–11.5)

## 2020-07-06 ENCOUNTER — HISTORICAL (OUTPATIENT)
Dept: HEMATOLOGY/ONCOLOGY | Facility: CLINIC | Age: 70
End: 2020-07-06

## 2020-07-06 LAB
ABS NEUT (OLG): 3.83 X10(3)/MCL (ref 2.1–9.2)
ALBUMIN SERPL-MCNC: 3.9 GM/DL (ref 3.4–4.8)
ALBUMIN/GLOB SERPL: 1.1 RATIO (ref 1.1–2)
ALP SERPL-CCNC: 49 UNIT/L (ref 40–150)
ALT SERPL-CCNC: 19 UNIT/L (ref 0–55)
AST SERPL-CCNC: 19 UNIT/L (ref 5–34)
B2 MICROGLOB SERPL-MCNC: 2.92 MG/L
BASOPHILS # BLD AUTO: 0 X10(3)/MCL (ref 0–0.2)
BASOPHILS NFR BLD AUTO: 0.5 %
BILIRUB SERPL-MCNC: 0.5 MG/DL
BILIRUBIN DIRECT+TOT PNL SERPL-MCNC: 0.2 MG/DL (ref 0–0.5)
BILIRUBIN DIRECT+TOT PNL SERPL-MCNC: 0.3 MG/DL (ref 0–0.8)
BUN SERPL-MCNC: 24.3 MG/DL (ref 8.4–25.7)
CALCIUM SERPL-MCNC: 9.5 MG/DL (ref 8.8–10)
CHLORIDE SERPL-SCNC: 108 MMOL/L (ref 98–107)
CO2 SERPL-SCNC: 25 MMOL/L (ref 23–31)
CREAT SERPL-MCNC: 1.09 MG/DL (ref 0.73–1.18)
EOSINOPHIL # BLD AUTO: 0.1 X10(3)/MCL (ref 0–0.9)
EOSINOPHIL NFR BLD AUTO: 1.7 %
ERYTHROCYTE [DISTWIDTH] IN BLOOD BY AUTOMATED COUNT: 14.4 % (ref 11.5–17)
GLOBULIN SER-MCNC: 3.7 GM/DL (ref 2.4–3.5)
GLUCOSE SERPL-MCNC: 78 MG/DL (ref 82–115)
HCT VFR BLD AUTO: 41.2 % (ref 42–52)
HGB BLD-MCNC: 12.9 GM/DL (ref 14–18)
LYMPHOCYTES # BLD AUTO: 1.5 X10(3)/MCL (ref 0.6–4.6)
LYMPHOCYTES NFR BLD AUTO: 25.2 %
MCH RBC QN AUTO: 28.4 PG (ref 27–31)
MCHC RBC AUTO-ENTMCNC: 31.3 GM/DL (ref 33–36)
MCV RBC AUTO: 90.5 FL (ref 80–94)
MONOCYTES # BLD AUTO: 0.5 X10(3)/MCL (ref 0.1–1.3)
MONOCYTES NFR BLD AUTO: 8.7 %
NEUTROPHILS # BLD AUTO: 3.8 X10(3)/MCL (ref 2.1–9.2)
NEUTROPHILS NFR BLD AUTO: 63.7 %
PLATELET # BLD AUTO: 220 X10(3)/MCL (ref 130–400)
PMV BLD AUTO: 9.7 FL (ref 9.4–12.4)
POTASSIUM SERPL-SCNC: 4.9 MMOL/L (ref 3.5–5.1)
PROT SERPL-MCNC: 7.6 GM/DL
PROT SERPL-MCNC: 7.6 GM/DL (ref 5.8–7.6)
RBC # BLD AUTO: 4.55 X10(6)/MCL (ref 4.7–6.1)
SODIUM SERPL-SCNC: 141 MMOL/L (ref 136–145)
WBC # SPEC AUTO: 6 X10(3)/MCL (ref 4.5–11.5)

## 2020-08-31 ENCOUNTER — HISTORICAL (OUTPATIENT)
Dept: HEMATOLOGY/ONCOLOGY | Facility: CLINIC | Age: 70
End: 2020-08-31

## 2020-08-31 LAB
ABS NEUT (OLG): 3.64 X10(3)/MCL (ref 2.1–9.2)
ALBUMIN SERPL-MCNC: 3.7 GM/DL (ref 3.4–5)
ALBUMIN/GLOB SERPL: 1.1 RATIO (ref 1.1–2)
ALP SERPL-CCNC: 52 UNIT/L (ref 40–150)
ALT SERPL-CCNC: 17 UNIT/L (ref 0–55)
AST SERPL-CCNC: 16 UNIT/L (ref 5–34)
B2 MICROGLOB SERPL-MCNC: 2.96 MG/L
BASOPHILS # BLD AUTO: 0.1 X10(3)/MCL (ref 0–0.2)
BASOPHILS NFR BLD AUTO: 1 %
BILIRUB SERPL-MCNC: 0.4 MG/DL
BILIRUBIN DIRECT+TOT PNL SERPL-MCNC: 0.2 MG/DL (ref 0–0.5)
BILIRUBIN DIRECT+TOT PNL SERPL-MCNC: 0.2 MG/DL (ref 0–0.8)
BUN SERPL-MCNC: 27.2 MG/DL (ref 8.4–25.7)
CALCIUM SERPL-MCNC: 9.2 MG/DL (ref 8.8–10)
CHLORIDE SERPL-SCNC: 107 MMOL/L (ref 98–107)
CO2 SERPL-SCNC: 25 MMOL/L (ref 23–31)
CREAT SERPL-MCNC: 1.37 MG/DL (ref 0.73–1.18)
EOSINOPHIL # BLD AUTO: 0.1 X10(3)/MCL (ref 0–0.9)
EOSINOPHIL NFR BLD AUTO: 2.2 %
ERYTHROCYTE [DISTWIDTH] IN BLOOD BY AUTOMATED COUNT: 13.7 % (ref 11.5–17)
GLOBULIN SER-MCNC: 3.5 GM/DL (ref 2.4–3.5)
GLUCOSE SERPL-MCNC: 81 MG/DL (ref 82–115)
HCT VFR BLD AUTO: 41 % (ref 42–52)
HGB BLD-MCNC: 13 GM/DL (ref 14–18)
LYMPHOCYTES # BLD AUTO: 1.6 X10(3)/MCL (ref 0.6–4.6)
LYMPHOCYTES NFR BLD AUTO: 27.3 %
MCH RBC QN AUTO: 29.1 PG (ref 27–31)
MCHC RBC AUTO-ENTMCNC: 31.7 GM/DL (ref 33–36)
MCV RBC AUTO: 91.7 FL (ref 80–94)
MONOCYTES # BLD AUTO: 0.5 X10(3)/MCL (ref 0.1–1.3)
MONOCYTES NFR BLD AUTO: 8.5 %
NEUTROPHILS # BLD AUTO: 3.6 X10(3)/MCL (ref 2.1–9.2)
NEUTROPHILS NFR BLD AUTO: 60.8 %
PLATELET # BLD AUTO: 208 X10(3)/MCL (ref 130–400)
PMV BLD AUTO: 9.6 FL (ref 9.4–12.4)
POTASSIUM SERPL-SCNC: 5 MMOL/L (ref 3.5–5.1)
PROT SERPL-MCNC: 7.2 GM/DL (ref 5.8–7.6)
PROT SERPL-MCNC: 7.4 GM/DL
RBC # BLD AUTO: 4.47 X10(6)/MCL (ref 4.7–6.1)
SODIUM SERPL-SCNC: 143 MMOL/L (ref 136–145)
WBC # SPEC AUTO: 6 X10(3)/MCL (ref 4.5–11.5)

## 2020-12-21 ENCOUNTER — HISTORICAL (OUTPATIENT)
Dept: HEMATOLOGY/ONCOLOGY | Facility: CLINIC | Age: 70
End: 2020-12-21

## 2020-12-21 LAB
ABS NEUT (OLG): 3.35 X10(3)/MCL (ref 2.1–9.2)
ALBUMIN SERPL-MCNC: 4.1 GM/DL (ref 3.4–4.8)
ALBUMIN/GLOB SERPL: 1.1 RATIO (ref 1.1–2)
ALP SERPL-CCNC: 47 UNIT/L (ref 40–150)
ALT SERPL-CCNC: 21 UNIT/L (ref 0–55)
AST SERPL-CCNC: 24 UNIT/L (ref 5–34)
B2 MICROGLOB SERPL-MCNC: 2.92 MG/L
BASOPHILS # BLD AUTO: 0 X10(3)/MCL (ref 0–0.2)
BASOPHILS NFR BLD AUTO: 0.9 %
BILIRUB SERPL-MCNC: 0.5 MG/DL
BILIRUBIN DIRECT+TOT PNL SERPL-MCNC: 0.2 MG/DL (ref 0–0.5)
BILIRUBIN DIRECT+TOT PNL SERPL-MCNC: 0.3 MG/DL (ref 0–0.8)
BUN SERPL-MCNC: 22.6 MG/DL (ref 8.4–25.7)
CALCIUM SERPL-MCNC: 9.5 MG/DL (ref 8.8–10)
CHLORIDE SERPL-SCNC: 105 MMOL/L (ref 98–107)
CO2 SERPL-SCNC: 26 MMOL/L (ref 23–31)
CREAT SERPL-MCNC: 1.13 MG/DL (ref 0.73–1.18)
EOSINOPHIL # BLD AUTO: 0.1 X10(3)/MCL (ref 0–0.9)
EOSINOPHIL NFR BLD AUTO: 1.7 %
ERYTHROCYTE [DISTWIDTH] IN BLOOD BY AUTOMATED COUNT: 13.5 % (ref 11.5–17)
FERRITIN SERPL-MCNC: 57.91 NG/ML (ref 21.81–274.66)
FOLATE SERPL-MCNC: 3.7 NG/ML (ref 7–31.4)
GLOBULIN SER-MCNC: 3.9 GM/DL (ref 2.4–3.5)
GLUCOSE SERPL-MCNC: 81 MG/DL (ref 82–115)
HCT VFR BLD AUTO: 43.5 % (ref 42–52)
HGB BLD-MCNC: 13.7 GM/DL (ref 14–18)
IRON SATN MFR SERPL: 25 % (ref 20–50)
IRON SERPL-MCNC: 91 UG/DL (ref 65–175)
LYMPHOCYTES # BLD AUTO: 1.5 X10(3)/MCL (ref 0.6–4.6)
LYMPHOCYTES NFR BLD AUTO: 28.2 %
MCH RBC QN AUTO: 29.5 PG (ref 27–31)
MCHC RBC AUTO-ENTMCNC: 31.5 GM/DL (ref 33–36)
MCV RBC AUTO: 93.5 FL (ref 80–94)
MONOCYTES # BLD AUTO: 0.4 X10(3)/MCL (ref 0.1–1.3)
MONOCYTES NFR BLD AUTO: 7.4 %
NEUTROPHILS # BLD AUTO: 3.4 X10(3)/MCL (ref 2.1–9.2)
NEUTROPHILS NFR BLD AUTO: 61.8 %
PLATELET # BLD AUTO: 206 X10(3)/MCL (ref 130–400)
PMV BLD AUTO: 9.7 FL (ref 9.4–12.4)
POTASSIUM SERPL-SCNC: 5 MMOL/L (ref 3.5–5.1)
PROT SERPL-MCNC: 7.9 GM/DL
PROT SERPL-MCNC: 8 GM/DL (ref 5.8–7.6)
RBC # BLD AUTO: 4.65 X10(6)/MCL (ref 4.7–6.1)
SODIUM SERPL-SCNC: 142 MMOL/L (ref 136–145)
TIBC SERPL-MCNC: 274 UG/DL (ref 69–240)
TIBC SERPL-MCNC: 365 UG/DL (ref 250–450)
TRANSFERRIN SERPL-MCNC: 317 MG/DL (ref 163–344)
VIT B12 SERPL-MCNC: 249 PG/ML (ref 213–816)
WBC # SPEC AUTO: 5.4 X10(3)/MCL (ref 4.5–11.5)

## 2021-03-02 ENCOUNTER — HISTORICAL (OUTPATIENT)
Dept: HEMATOLOGY/ONCOLOGY | Facility: CLINIC | Age: 71
End: 2021-03-02

## 2021-03-02 LAB
ABS NEUT (OLG): 3.44 X10(3)/MCL (ref 2.1–9.2)
ALBUMIN SERPL-MCNC: 3.8 GM/DL (ref 3.4–4.8)
ALBUMIN/GLOB SERPL: 1.1 RATIO (ref 1.1–2)
ALP SERPL-CCNC: 46 UNIT/L (ref 40–150)
ALT SERPL-CCNC: 19 UNIT/L (ref 0–55)
AST SERPL-CCNC: 17 UNIT/L (ref 5–34)
BASOPHILS # BLD AUTO: 0 X10(3)/MCL (ref 0–0.2)
BASOPHILS NFR BLD AUTO: 0.8 %
BILIRUB SERPL-MCNC: 0.4 MG/DL
BILIRUBIN DIRECT+TOT PNL SERPL-MCNC: 0.2 MG/DL (ref 0–0.5)
BILIRUBIN DIRECT+TOT PNL SERPL-MCNC: 0.2 MG/DL (ref 0–0.8)
BUN SERPL-MCNC: 18.9 MG/DL (ref 8.4–25.7)
CALCIUM SERPL-MCNC: 9.9 MG/DL (ref 8.8–10)
CHLORIDE SERPL-SCNC: 109 MMOL/L (ref 98–107)
CO2 SERPL-SCNC: 25 MMOL/L (ref 23–31)
CREAT SERPL-MCNC: 0.99 MG/DL (ref 0.73–1.18)
EOSINOPHIL # BLD AUTO: 0.1 X10(3)/MCL (ref 0–0.9)
EOSINOPHIL NFR BLD AUTO: 1.9 %
ERYTHROCYTE [DISTWIDTH] IN BLOOD BY AUTOMATED COUNT: 13.9 % (ref 11.5–17)
GLOBULIN SER-MCNC: 3.6 GM/DL (ref 2.4–3.5)
GLUCOSE SERPL-MCNC: 79 MG/DL (ref 82–115)
HCT VFR BLD AUTO: 40.1 % (ref 42–52)
HGB BLD-MCNC: 12.9 GM/DL (ref 14–18)
LYMPHOCYTES # BLD AUTO: 1.3 X10(3)/MCL (ref 0.6–4.6)
LYMPHOCYTES NFR BLD AUTO: 24 %
MCH RBC QN AUTO: 29.7 PG (ref 27–31)
MCHC RBC AUTO-ENTMCNC: 32.2 GM/DL (ref 33–36)
MCV RBC AUTO: 92.4 FL (ref 80–94)
MONOCYTES # BLD AUTO: 0.4 X10(3)/MCL (ref 0.1–1.3)
MONOCYTES NFR BLD AUTO: 8.3 %
NEUTROPHILS # BLD AUTO: 3.4 X10(3)/MCL (ref 2.1–9.2)
NEUTROPHILS NFR BLD AUTO: 64.8 %
PLATELET # BLD AUTO: 199 X10(3)/MCL (ref 130–400)
PMV BLD AUTO: 10 FL (ref 9.4–12.4)
POTASSIUM SERPL-SCNC: 4.4 MMOL/L (ref 3.5–5.1)
PROT SERPL-MCNC: 7.4 GM/DL (ref 5.8–7.6)
PROT SERPL-MCNC: 7.9 GM/DL
RBC # BLD AUTO: 4.34 X10(6)/MCL (ref 4.7–6.1)
SODIUM SERPL-SCNC: 143 MMOL/L (ref 136–145)
WBC # SPEC AUTO: 5.3 X10(3)/MCL (ref 4.5–11.5)

## 2021-06-03 ENCOUNTER — HISTORICAL (OUTPATIENT)
Dept: HEMATOLOGY/ONCOLOGY | Facility: CLINIC | Age: 71
End: 2021-06-03

## 2021-06-03 LAB
ABS NEUT (OLG): 3.45 X10(3)/MCL (ref 2.1–9.2)
ALBUMIN % SPEP (OHS): 47.62 % (ref 48.1–59.5)
ALBUMIN SERPL-MCNC: 3.6 GM/DL (ref 3.4–4.8)
ALBUMIN SERPL-MCNC: 3.7 GM/DL (ref 3.4–4.8)
ALBUMIN/GLOB SERPL: 1.1 RATIO (ref 1.1–2)
ALBUMIN/GLOB SERPL: 1.1 RATIO (ref 1.1–2)
ALP SERPL-CCNC: 42 UNIT/L (ref 40–150)
ALPHA 1 GLOB (OHS): 0.22 GM/DL (ref 0–0.4)
ALPHA 1 GLOB% (OHS): 3.08 % (ref 2.3–4.9)
ALPHA 2 GLOB % (OHS): 12.12 % (ref 6.9–13)
ALPHA 2 GLOB (OHS): 0.86 GM/DL (ref 0.4–1)
ALT SERPL-CCNC: 14 UNIT/L (ref 0–55)
AST SERPL-CCNC: 17 UNIT/L (ref 5–34)
B2 MICROGLOB SERPL-MCNC: 2.66 MG/L
BASOPHILS # BLD AUTO: 0 X10(3)/MCL (ref 0–0.2)
BASOPHILS NFR BLD AUTO: 0.8 %
BETA GLOB (OHS): 2.31 GM/DL (ref 0.7–1.3)
BETA GLOB% (OHS): 32.5 % (ref 13.8–19.7)
BILIRUB SERPL-MCNC: 0.4 MG/DL
BILIRUBIN DIRECT+TOT PNL SERPL-MCNC: 0.2 MG/DL (ref 0–0.5)
BILIRUBIN DIRECT+TOT PNL SERPL-MCNC: 0.2 MG/DL (ref 0–0.8)
BUN SERPL-MCNC: 21.7 MG/DL (ref 8.4–25.7)
CALCIUM SERPL-MCNC: 9.5 MG/DL (ref 8.8–10)
CHLORIDE SERPL-SCNC: 110 MMOL/L (ref 98–107)
CO2 SERPL-SCNC: 23 MMOL/L (ref 23–31)
CREAT SERPL-MCNC: 0.93 MG/DL (ref 0.73–1.18)
EOSINOPHIL # BLD AUTO: 0.1 X10(3)/MCL (ref 0–0.9)
EOSINOPHIL NFR BLD AUTO: 1.9 %
ERYTHROCYTE [DISTWIDTH] IN BLOOD BY AUTOMATED COUNT: 13.1 % (ref 11.5–17)
GAMMA GLOBULIN % (OHS): 4.68 % (ref 10.1–21.9)
GAMMA GLOBULIN (OHS): 0.33 GM/DL (ref 0.4–1.8)
GLOBULIN SER-MCNC: 3.3 GM/DL (ref 2.4–3.5)
GLOBULIN SER-MCNC: 3.4 GM/DL (ref 2.4–3.5)
GLUCOSE SERPL-MCNC: 113 MG/DL (ref 82–115)
HCT VFR BLD AUTO: 38.6 % (ref 42–52)
HGB BLD-MCNC: 12.3 GM/DL (ref 14–18)
IFE INTERPRETATION (OHS): ABNORMAL
IGA SERPL-MCNC: 65 MG/DL (ref 101–645)
IGG SERPL-MCNC: 1661 MG/DL (ref 240–1822)
IGM SERPL-MCNC: <25 MG/DL (ref 22–240)
LYMPHOCYTES # BLD AUTO: 1.4 X10(3)/MCL (ref 0.6–4.6)
LYMPHOCYTES NFR BLD AUTO: 25.7 %
M SPIKE % (OHS): 16.23 %
M SPIKE (OHS): 1.15 GM/DL
MCH RBC QN AUTO: 29.1 PG (ref 27–31)
MCHC RBC AUTO-ENTMCNC: 31.9 GM/DL (ref 33–36)
MCV RBC AUTO: 91.3 FL (ref 80–94)
MONOCYTES # BLD AUTO: 0.3 X10(3)/MCL (ref 0.1–1.3)
MONOCYTES NFR BLD AUTO: 6.2 %
NEUTROPHILS # BLD AUTO: 3.4 X10(3)/MCL (ref 2.1–9.2)
NEUTROPHILS NFR BLD AUTO: 65.2 %
PEP GRAPH (OHS): ABNORMAL
PLATELET # BLD AUTO: 197 X10(3)/MCL (ref 130–400)
PMV BLD AUTO: 9.4 FL (ref 9.4–12.4)
POTASSIUM SERPL-SCNC: 4.2 MMOL/L (ref 3.5–5.1)
PROT SERPL-MCNC: 6.9 GM/DL (ref 5.8–7.6)
PROT SERPL-MCNC: 7.1 GM/DL (ref 5.8–7.6)
RBC # BLD AUTO: 4.23 X10(6)/MCL (ref 4.7–6.1)
SODIUM SERPL-SCNC: 143 MMOL/L (ref 136–145)
WBC # SPEC AUTO: 5.3 X10(3)/MCL (ref 4.5–11.5)

## 2021-09-20 ENCOUNTER — HISTORICAL (OUTPATIENT)
Dept: HEMATOLOGY/ONCOLOGY | Facility: CLINIC | Age: 71
End: 2021-09-20

## 2021-09-20 LAB
ABS NEUT (OLG): 3.66 X10(3)/MCL (ref 2.1–9.2)
ALBUMIN % SPEP (OHS): 49.71 % (ref 48.1–59.5)
ALBUMIN SERPL-MCNC: 3.7 GM/DL (ref 3.4–4.8)
ALBUMIN SERPL-MCNC: 3.7 GM/DL (ref 3.4–4.8)
ALBUMIN/GLOB SERPL: 1 RATIO (ref 1.1–2)
ALBUMIN/GLOB SERPL: 1.1 RATIO (ref 1.1–2)
ALP SERPL-CCNC: 42 UNIT/L (ref 40–150)
ALPHA 1 GLOB (OHS): 0.21 GM/DL (ref 0–0.4)
ALPHA 1 GLOB% (OHS): 2.96 % (ref 2.3–4.9)
ALPHA 2 GLOB % (OHS): 11.56 % (ref 6.9–13)
ALPHA 2 GLOB (OHS): 0.83 GM/DL (ref 0.4–1)
ALT SERPL-CCNC: 20 UNIT/L (ref 0–55)
AST SERPL-CCNC: 19 UNIT/L (ref 5–34)
BASOPHILS # BLD AUTO: 0 X10(3)/MCL (ref 0–0.2)
BASOPHILS NFR BLD AUTO: 0.7 %
BETA GLOB (OHS): 2.25 GM/DL (ref 0.7–1.3)
BETA GLOB% (OHS): 31.31 % (ref 13.8–19.7)
BILIRUB SERPL-MCNC: 0.5 MG/DL
BILIRUBIN DIRECT+TOT PNL SERPL-MCNC: 0.2 MG/DL (ref 0–0.5)
BILIRUBIN DIRECT+TOT PNL SERPL-MCNC: 0.3 MG/DL (ref 0–0.8)
BUN SERPL-MCNC: 15.5 MG/DL (ref 8.4–25.7)
CALCIUM SERPL-MCNC: 9.8 MG/DL (ref 8.8–10)
CHLORIDE SERPL-SCNC: 106 MMOL/L (ref 98–107)
CO2 SERPL-SCNC: 22 MMOL/L (ref 23–31)
CREAT SERPL-MCNC: 0.96 MG/DL (ref 0.73–1.18)
EOSINOPHIL # BLD AUTO: 0.1 X10(3)/MCL (ref 0–0.9)
EOSINOPHIL NFR BLD AUTO: 1.3 %
ERYTHROCYTE [DISTWIDTH] IN BLOOD BY AUTOMATED COUNT: 13.3 % (ref 11.5–17)
GAMMA GLOBULIN % (OHS): 4.46 % (ref 10.1–21.9)
GAMMA GLOBULIN (OHS): 0.32 GM/DL (ref 0.4–1.8)
GLOBULIN SER-MCNC: 3.5 GM/DL (ref 2.4–3.5)
GLOBULIN SER-MCNC: 3.6 GM/DL (ref 2.4–3.5)
GLUCOSE SERPL-MCNC: 109 MG/DL (ref 82–115)
HCT VFR BLD AUTO: 40.9 % (ref 42–52)
HGB BLD-MCNC: 13 GM/DL (ref 14–18)
IFE INTERPRETATION (OHS): ABNORMAL
IGA SERPL-MCNC: 69 MG/DL (ref 101–645)
IGA SERPL-MCNC: 69 MG/DL (ref 101–645)
IGG SERPL-MCNC: 1705 MG/DL (ref 240–1822)
IGG SERPL-MCNC: 1760 MG/DL (ref 240–1822)
IGM SERPL-MCNC: 25 MG/DL (ref 22–240)
IGM SERPL-MCNC: 25 MG/DL (ref 22–240)
LYMPHOCYTES # BLD AUTO: 1.4 X10(3)/MCL (ref 0.6–4.6)
LYMPHOCYTES NFR BLD AUTO: 25.9 %
M SPIKE % (OHS): 14.57 %
M SPIKE (OHS): 1.05 GM/DL
MCH RBC QN AUTO: 29.5 PG (ref 27–31)
MCHC RBC AUTO-ENTMCNC: 31.8 GM/DL (ref 33–36)
MCV RBC AUTO: 93 FL (ref 80–94)
MONOCYTES # BLD AUTO: 0.4 X10(3)/MCL (ref 0.1–1.3)
MONOCYTES NFR BLD AUTO: 6.3 %
NEUTROPHILS # BLD AUTO: 3.7 X10(3)/MCL (ref 2.1–9.2)
NEUTROPHILS NFR BLD AUTO: 65.6 %
PEP GRAPH (OHS): ABNORMAL
PLATELET # BLD AUTO: 193 X10(3)/MCL (ref 130–400)
PMV BLD AUTO: 8.9 FL (ref 9.4–12.4)
POTASSIUM SERPL-SCNC: 4.1 MMOL/L (ref 3.5–5.1)
PROT SERPL-MCNC: 7.2 GM/DL (ref 5.8–7.6)
PROT SERPL-MCNC: 7.3 GM/DL (ref 5.8–7.6)
RBC # BLD AUTO: 4.4 X10(6)/MCL (ref 4.7–6.1)
SODIUM SERPL-SCNC: 142 MMOL/L (ref 136–145)
WBC # SPEC AUTO: 5.6 X10(3)/MCL (ref 4.5–11.5)

## 2021-12-20 ENCOUNTER — HISTORICAL (OUTPATIENT)
Dept: HEMATOLOGY/ONCOLOGY | Facility: CLINIC | Age: 71
End: 2021-12-20

## 2021-12-20 LAB
ABS NEUT (OLG): 3.14 X10(3)/MCL (ref 2.1–9.2)
ALBUMIN % SPEP (OHS): 45.54 % (ref 48.1–59.5)
ALBUMIN SERPL-MCNC: 3.6 GM/DL (ref 3.4–4.8)
ALBUMIN SERPL-MCNC: 3.6 GM/DL (ref 3.4–4.8)
ALBUMIN/GLOB SERPL: 1 RATIO (ref 1.1–2)
ALBUMIN/GLOB SERPL: 1 RATIO (ref 1.1–2)
ALP SERPL-CCNC: 42 UNIT/L (ref 40–150)
ALPHA 1 GLOB (OHS): 0.24 GM/DL (ref 0–0.4)
ALPHA 1 GLOB% (OHS): 3.32 % (ref 2.3–4.9)
ALPHA 2 GLOB % (OHS): 14.39 % (ref 6.9–13)
ALPHA 2 GLOB (OHS): 1.05 GM/DL (ref 0.4–1)
ALT SERPL-CCNC: 19 UNIT/L (ref 0–55)
AST SERPL-CCNC: 18 UNIT/L (ref 5–34)
B2 MICROGLOB SERPL-MCNC: 2.49 MG/L
BASOPHILS # BLD AUTO: 0 X10(3)/MCL (ref 0–0.2)
BASOPHILS NFR BLD AUTO: 1 %
BETA GLOB (OHS): 2.32 GM/DL (ref 0.7–1.3)
BETA GLOB% (OHS): 31.78 % (ref 13.8–19.7)
BILIRUB SERPL-MCNC: 0.3 MG/DL
BILIRUBIN DIRECT+TOT PNL SERPL-MCNC: 0.1 MG/DL (ref 0–0.8)
BILIRUBIN DIRECT+TOT PNL SERPL-MCNC: 0.2 MG/DL (ref 0–0.5)
BUN SERPL-MCNC: 21.8 MG/DL (ref 8.4–25.7)
CALCIUM SERPL-MCNC: 9.9 MG/DL (ref 8.7–10.5)
CHLORIDE SERPL-SCNC: 108 MMOL/L (ref 98–107)
CO2 SERPL-SCNC: 27 MMOL/L (ref 23–31)
CREAT SERPL-MCNC: 0.95 MG/DL (ref 0.73–1.18)
EOSINOPHIL # BLD AUTO: 0.1 X10(3)/MCL (ref 0–0.9)
EOSINOPHIL NFR BLD AUTO: 1.6 %
ERYTHROCYTE [DISTWIDTH] IN BLOOD BY AUTOMATED COUNT: 13.1 % (ref 11.5–17)
GAMMA GLOBULIN % (OHS): 4.96 % (ref 10.1–21.9)
GAMMA GLOBULIN (OHS): 0.36 GM/DL (ref 0.4–1.8)
GLOBULIN SER-MCNC: 3.6 GM/DL (ref 2.4–3.5)
GLOBULIN SER-MCNC: 3.7 GM/DL (ref 2.4–3.5)
GLUCOSE SERPL-MCNC: 73 MG/DL (ref 82–115)
HCT VFR BLD AUTO: 40.8 % (ref 42–52)
HGB BLD-MCNC: 13.1 GM/DL (ref 14–18)
IFE INTERPRETATION (OHS): ABNORMAL
IGA SERPL-MCNC: 78 MG/DL (ref 101–645)
IGG SERPL-MCNC: 1716 MG/DL (ref 240–1822)
IGM SERPL-MCNC: 22 MG/DL (ref 22–240)
LYMPHOCYTES # BLD AUTO: 1.3 X10(3)/MCL (ref 0.6–4.6)
LYMPHOCYTES NFR BLD AUTO: 26 %
M SPIKE % (OHS): 15.25 %
M SPIKE (OHS): 1.11 GM/DL
MCH RBC QN AUTO: 29.8 PG (ref 27–31)
MCHC RBC AUTO-ENTMCNC: 32.1 GM/DL (ref 33–36)
MCV RBC AUTO: 92.9 FL (ref 80–94)
MONOCYTES # BLD AUTO: 0.4 X10(3)/MCL (ref 0.1–1.3)
MONOCYTES NFR BLD AUTO: 7.9 %
NEUTROPHILS # BLD AUTO: 3.1 X10(3)/MCL (ref 2.1–9.2)
NEUTROPHILS NFR BLD AUTO: 63.3 %
PEP GRAPH (OHS): ABNORMAL
PLATELET # BLD AUTO: 189 X10(3)/MCL (ref 130–400)
PMV BLD AUTO: 9.9 FL (ref 9.4–12.4)
POTASSIUM SERPL-SCNC: 4.6 MMOL/L (ref 3.5–5.1)
PROT SERPL-MCNC: 7.2 GM/DL (ref 5.8–7.6)
PROT SERPL-MCNC: 7.3 GM/DL (ref 5.8–7.6)
RBC # BLD AUTO: 4.39 X10(6)/MCL (ref 4.7–6.1)
SODIUM SERPL-SCNC: 143 MMOL/L (ref 136–145)
WBC # SPEC AUTO: 5 X10(3)/MCL (ref 4.5–11.5)

## 2022-02-21 ENCOUNTER — HISTORICAL (OUTPATIENT)
Dept: HEMATOLOGY/ONCOLOGY | Facility: CLINIC | Age: 72
End: 2022-02-21

## 2022-02-21 LAB
ABS NEUT (OLG): 3.92 (ref 2.1–9.2)
ALBUMIN SERPL-MCNC: 3.8 G/DL (ref 3.4–4.8)
ALBUMIN/GLOB SERPL: 1 {RATIO} (ref 1.1–2)
ALP SERPL-CCNC: 45 U/L (ref 40–150)
ALT SERPL-CCNC: 18 U/L (ref 0–55)
AST SERPL-CCNC: 22 U/L (ref 5–34)
B2 MICROGLOB SERPL-MCNC: 2.66
BASOPHILS # BLD AUTO: 0 10*3/UL (ref 0–0.2)
BASOPHILS NFR BLD AUTO: 0.6 %
BILIRUB SERPL-MCNC: 0.4 MG/DL
BILIRUBIN DIRECT+TOT PNL SERPL-MCNC: 0.2 (ref 0–0.5)
BILIRUBIN DIRECT+TOT PNL SERPL-MCNC: 0.2 (ref 0–0.8)
BUN SERPL-MCNC: 22.3 MG/DL (ref 8.4–25.7)
CALCIUM SERPL-MCNC: 9.8 MG/DL (ref 8.7–10.5)
CHLORIDE SERPL-SCNC: 109 MMOL/L (ref 98–107)
CO2 SERPL-SCNC: 23 MMOL/L (ref 23–31)
CREAT SERPL-MCNC: 1.09 MG/DL (ref 0.73–1.18)
EOSINOPHIL # BLD AUTO: 0.1 10*3/UL (ref 0–0.9)
EOSINOPHIL NFR BLD AUTO: 1 %
ERYTHROCYTE [DISTWIDTH] IN BLOOD BY AUTOMATED COUNT: 13.6 % (ref 11.5–17)
GLOBULIN SER-MCNC: 3.7 G/DL (ref 2.4–3.5)
GLUCOSE SERPL-MCNC: 101 MG/DL (ref 82–115)
HCT VFR BLD AUTO: 40.7 % (ref 42–52)
HEMOLYSIS INTERF INDEX SERPL-ACNC: 6
HGB BLD-MCNC: 13.1 G/DL (ref 14–18)
ICTERIC INTERF INDEX SERPL-ACNC: 0
LIPEMIC INTERF INDEX SERPL-ACNC: 15
LYMPHOCYTES # BLD AUTO: 1.7 10*3/UL (ref 0.6–4.6)
LYMPHOCYTES NFR BLD AUTO: 27.9 %
MANUAL DIFF? (OHS): NO
MCH RBC QN AUTO: 29.8 PG (ref 27–31)
MCHC RBC AUTO-ENTMCNC: 32.2 G/DL (ref 33–36)
MCV RBC AUTO: 92.5 FL (ref 80–94)
MONOCYTES # BLD AUTO: 0.5 10*3/UL (ref 0.1–1.3)
MONOCYTES NFR BLD AUTO: 7.5 %
NEUTROPHILS # BLD AUTO: 3.9 10*3/UL (ref 2.1–9.2)
NEUTROPHILS NFR BLD AUTO: 62.8 %
PLATELET # BLD AUTO: 216 10*3/UL (ref 130–400)
PMV BLD AUTO: 9.6 FL (ref 9.4–12.4)
POTASSIUM SERPL-SCNC: 4.4 MMOL/L (ref 3.5–5.1)
PROT SERPL-MCNC: 7.5 G/DL (ref 5.8–7.6)
RBC # BLD AUTO: 4.4 10*6/UL (ref 4.7–6.1)
SODIUM SERPL-SCNC: 140 MMOL/L (ref 136–145)
WBC # SPEC AUTO: 6.2 10*3/UL (ref 4.5–11.5)

## 2022-04-10 ENCOUNTER — HISTORICAL (OUTPATIENT)
Dept: ADMINISTRATIVE | Facility: HOSPITAL | Age: 72
End: 2022-04-10
Payer: COMMERCIAL

## 2022-04-26 DIAGNOSIS — M25.552 HIP PAIN, LEFT: Primary | ICD-10-CM

## 2022-04-28 VITALS
WEIGHT: 295 LBS | DIASTOLIC BLOOD PRESSURE: 66 MMHG | BODY MASS INDEX: 46.3 KG/M2 | SYSTOLIC BLOOD PRESSURE: 126 MMHG | HEIGHT: 67 IN

## 2022-04-29 DIAGNOSIS — C90.00 MULTIPLE MYELOMA, REMISSION STATUS UNSPECIFIED: Primary | ICD-10-CM

## 2022-04-29 DIAGNOSIS — M25.559 HIP PAIN: ICD-10-CM

## 2022-04-29 DIAGNOSIS — D64.9 ANEMIA, UNSPECIFIED TYPE: ICD-10-CM

## 2022-05-03 ENCOUNTER — HOSPITAL ENCOUNTER (OUTPATIENT)
Dept: RADIOLOGY | Facility: HOSPITAL | Age: 72
Discharge: HOME | End: 2022-05-03
Attending: INTERNAL MEDICINE
Payer: MEDICARE

## 2022-05-03 DIAGNOSIS — M25.552 HIP PAIN, LEFT: ICD-10-CM

## 2022-05-03 PROCEDURE — 73721 MRI JNT OF LWR EXTRE W/O DYE: CPT | Mod: TC,LT

## 2022-05-04 NOTE — HISTORICAL [facility]
This is a historical note converted from [employer]. Formatting and pictures may have been removed.  Please reference [employer] for original formatting and attached multimedia. Chief Complaint  Pt is here for right knee pain. Pt stated he twisted his knee when carrying a bucket up some steps.  History of Present Illness  Patient twisted his right knee carrying a 3?gallon jug of water?up?his 2 steps at his home?a couple of days ago.  Review of Systems  Systemic: No fever, no chills, and no recent weight change.  Head: No headache - frequent.  Eyes: Poor vision secondary to optic neuropathy  Otolarnygeal: No hearing loss, no earache, no epistaxis, no hoarseness, and no tooth pain. Gums normal.  Cardiovascular: No chest pain or discomfort and no palpitations.  Pulmonary: No pulmonary symptoms - difficulty sleeping, no dyspnea, and cough not worse in the morning.  Gastrointestinal: Appetite not decreased. No dysphagia and no constant eructation. No nausea, no vomiting, no abdominal pain, no hematochezia, and no loose/mushy stools - frequent. No constipation - frequent.  Genitourinary: No genitourinary symptoms - Getting up every night to urinate and no increase in urinary frequency. No urinary hesitancy. No urinary loss of control - difficulty stopping urination and no burning sensation during urination.  Musculoskeletal: No calf muscle cramps and no localized soft tissue swelling of the ankle.  Neurological: No fainting and no convulsions.  Psychological: Not feeling nervous tension, not feeling nervous from exhaustion, and no depression.  Skin: No rash. Previous history of no ulcers.  Physical Exam  Vitals & Measurements  BP:?126/66?  HT:?170?cm? WT:?133.80?kg? BMI:?46.3?  Right knee exam:  Trace effusion  Tenderness to palpation over the?mid substance and tibial insertion?of the MCL  Mild right medial joint line tenderness  No lateral tenderness  No pathologic laxity of the cruciate or collateral ligaments  Active range of  motion 0 degrees to 120 degrees with pain?during flexion past 90 degrees  Antalgic gait  Ambulates with a cane  2+ posterior tibial and 1+ dorsal pedal pulse  Grossly intact sensation but it is slightly decreased to light touch?from the mid shin distal on both lower extremities  Intact motor function  Intact extensor mechanism  Radiographs of the right and left knees show mild medial compartment cartilage space narrowing?and mild degenerative changes. ?No loose bodies. ?No evidence of fracture or dislocation.  Assessment/Plan  1.?Sprain of medial collateral ligament of right knee?S83.411A  ? Home exercises for the next couple of weeks and if he is not improved in?2 weeks he will call and we will order physical therapy.  Ordered:  betamethasone, 12 mg, Intra-Articular, Once, first dose 01/28/19 14:00:00 CST, stop date 01/28/19 14:00:00 CST  Lidocaine inj., 2 mL, Intra-Articular, Once, first dose 01/28/19 13:52:00 CST, stop date 01/28/19 13:52:00 CST  asp/inj jnt/bursa, major [id number] PC, 01/28/19 13:52:00 CST, [facility], Routine, 01/28/19 13:52:00 CST  Office/Outpatient Visit Level 2 New [id number] PC, Sprain of medial collateral ligament of right knee, [facility], 01/28/19 13:52:00 CST  ?  Pain in right knee?M25.561  Ordered:  XR Knee Right 4 or More Views, Routine, 01/28/19 13:26:00 CST, Pain, None, Ambulatory, Rad Type, Pain in right knee, 01/28/19 13:26:00 CST  ?  Orders:  Clinic Follow-up PRN, 01/28/19 13:52:00 CST, Future Order, [facility]   Problem List/Past Medical History  Ongoing  CAD (coronary artery disease)  Diabetes  Hyperlipidemia  Hypertension  ischemic optic neuropathy  Multiple myeloma  Obesity  Reflux  Shortness of breath  sleep apnea  Sprain of medial collateral ligament of right knee  Historical  abdominal aneurysm  Kidney stones  Smoldering multiple myeloma  Procedure/Surgical History  Biopsy Bone Marrow Aspiration (., None) (06/11/2018)  Colonoscopy (06/27/2017)  Biopsy Bone  Marrow Aspiration (., Right) (09/25/2015)  Biopsy Bone Marrow Aspiration (., Right, Flank) (07/17/2014)  Lumbar Puncture (., Back) (10/22/2013)  AAA (abdominal aortic aneurysm)  Arthroscopy of knee BILATERAL  lt shoulder surgery dec 2014  lumbar puncture  urtheral stents   Medications  Actos 30 mg oral tablet, 30 mg= 1 tab(s), Oral, Daily  aspirin 81 mg oral tablet, 81 mg= 1 tab(s), Oral, Daily,? ?Not taking  Ativan 1 mg oral tablet, See Instructions,? ?Not taking  Demetria 10 mg-40 mg oral tablet, 1 tab(s), Oral, Daily  baclofen 10 mg oral tablet, 1 tab, Oral, At Bedtime  Bystolic, 5 mg, Oral, At Bedtime  Celestone, 12 mg, Intra-Articular, Once  doxazosin 2 mg oral tablet, 2 mg= 1 tab(s), Oral, Daily  esomeprazole 40 mg oral delayed release capsule ([facility] Substitution), 1 tab(s), Oral, Daily  fenofibrate 160 mg oral tablet, 1 TAB, Oral, At Bedtime  finasteride 5 mg oral tablet, 5 mg= 1 tab(s), Oral, Daily  furosemide 40 mg oral tablet, 20 mg= 0.5 tab(s), Oral, Every other day  guanfacine 1 mg oral tablet, 1 mg= 1 tab(s), Oral, Once a day (at bedtime),? ?Not taking  Januvia 100 mg oral tablet, 100 mg= 1 tab(s), Oral, Daily  lidocaine 2% injectable solution, 2 mL, Intra-Articular, Once  lovastatin, 40 mg, Oral, At Bedtime  Nasonex 50 mcg/inh nasal spray, 2 spray(s), Both Nostrils, Daily,? ?Not taking  potassium chloride 20 mEq oral TABLET extended release, 20 mEq= 1 tab(s), Oral, Daily  tamsulosin 0.4 mg oral capsule, 0.4 mg= 1 cap(s), Oral, Daily  traMADOL, 50 mg, Oral, q6hr, PRN  traZODONE 50 mg oral tablet ( Desyrel ), 50 mg= 1 tab(s), Oral, Once a day (at bedtime)  Victoza 18 mg/3 mL subcutaneous injection, 1.8 mg, Subcutaneous, Daily  Vitamin B12,? ?Not taking  Zantac 150, 150 mg, Oral, BID  zolpidem 12.5 mg oral tablet, extended release, 12.5 mg= 1 tab(s), Oral, Once a day (at bedtime), PRN,? ?Not taking  Allergies  Bactrim?(SEVERLY NAUSEATED)  Macrobid?(SEVERLY NAUSEATED)  Toradol?(SEVERLY NAUSEATED)  Social  History  Alcohol  Current, 09/25/2012  Employment/School  Retired, 06/11/2014  Exercise - Does not exercise, 06/11/2014  Home/Environment  Lives with Spouse., 06/11/2014  Nutrition/Health  Regular, 06/11/2014  Substance Abuse - Denies Substance Abuse, 10/18/2013  Never, 10/11/2018  Tobacco - Denies Tobacco Use, 10/18/2013  Past, Cigarettes, 09/24/2012  Family History  Cardiac arrest: Father.  Health Maintenance  Health Maintenance  ???Pending?(in the next year)  ??? ??OverDue  ??? ? ? ?Coronary Artery Disease Maintenance-Antiplatelet Agent Prescribed due??and every?  ??? ? ? ?Coronary Artery Disease Maintenance-Lipid Lowering Therapy due??and every?  ??? ? ? ?Diabetes Maintenance-HgbA1c due??09/05/13??and every 1??year(s)  ??? ? ? ?Aspirin Therapy for CVD Prevention due??04/18/17??and every 1??year(s)  ??? ??Due?  ??? ? ? ?ADL Screening due??01/28/19??and every 1??year(s)  ??? ? ? ?Alcohol Misuse Screening due??01/28/19??and every 1??year(s)  ??? ? ? ?Cognitive Screening due??01/28/19??and every 1??year(s)  ??? ? ? ?Diabetes Maintenance-Fasting Lipid Profile due??01/28/19??and every?  ??? ? ? ?Diabetes Maintenance-Foot Exam due??01/28/19??and every?  ??? ? ? ?Functional Assessment due??01/28/19??and every 1??year(s)  ??? ? ? ?Geriatric Depression Screening due??01/28/19??and every 1??year(s)  ??? ? ? ?Pneumococcal Vaccine due??01/28/19??Variable frequency  ??? ? ? ?Pneumococcal Vaccine due??01/28/19??and every?  ??? ? ? ?Tetanus Vaccine due??01/28/19??and every 10??year(s)  ??? ? ? ?Zoster Vaccine due??01/28/19??and every 100??year(s)  ??? ??Due In Future?  ??? ? ? ?Advance Directive not due until??06/25/19??and every 1??year(s)  ??? ? ? ?Hypertension Management-BMP not due until??09/25/19??and every 1??year(s)  ??? ? ? ?Fall Risk Assessment not due until??10/11/19??and every 1??year(s)  ???Satisfied?(in the past 1 year)  ??? ??Satisfied?  ??? ? ? ?Advance Directive on??06/25/18.??Satisfied by Frank GEORGE,  Renita  ??? ? ? ?Blood Pressure Screening on??01/28/19.??Satisfied by Etelvina Thurman LPN  ??? ? ? ?Body Mass Index Check on??01/28/19.??Satisfied by Etelvina Thurman LPN  ??? ? ? ?Depression Screening on??10/11/18.??Satisfied by Renita Marie LPN  ??? ? ? ?Diabetes Screening on??09/25/18.??Satisfied by Criss Calderon MT  ??? ? ? ?Fall Risk Assessment on??10/11/18.??Satisfied by Renita Marie LPN  ??? ? ? ?Hypertension Management-Blood Pressure on??01/28/19.??Satisfied by Etelvina Thurman LPN  ??? ? ? ?Obesity Screening on??01/28/19.??Satisfied by Etelvina Thurman LPN  ?  ?      After verbal consent right knee was prepped in sterile fashion. 2 mL of lidocaine and 2 mL of betamethasone was injected intra-articularly on a 25-gauge needle. Patient tolerated the procedure well  ?  ?

## 2022-05-06 ENCOUNTER — OFFICE VISIT (OUTPATIENT)
Dept: HEMATOLOGY/ONCOLOGY | Facility: CLINIC | Age: 72
End: 2022-05-06
Payer: MEDICARE

## 2022-05-06 VITALS
OXYGEN SATURATION: 96 % | TEMPERATURE: 98 F | RESPIRATION RATE: 18 BRPM | DIASTOLIC BLOOD PRESSURE: 82 MMHG | HEIGHT: 67 IN | WEIGHT: 267.19 LBS | HEART RATE: 57 BPM | BODY MASS INDEX: 41.94 KG/M2 | SYSTOLIC BLOOD PRESSURE: 151 MMHG

## 2022-05-06 DIAGNOSIS — C90.00 MULTIPLE MYELOMA, REMISSION STATUS UNSPECIFIED: Primary | ICD-10-CM

## 2022-05-06 PROCEDURE — 3079F DIAST BP 80-89 MM HG: CPT | Mod: CPTII,S$GLB,, | Performed by: INTERNAL MEDICINE

## 2022-05-06 PROCEDURE — 3288F PR FALLS RISK ASSESSMENT DOCUMENTED: ICD-10-PCS | Mod: CPTII,S$GLB,, | Performed by: INTERNAL MEDICINE

## 2022-05-06 PROCEDURE — 1159F MED LIST DOCD IN RCRD: CPT | Mod: CPTII,S$GLB,, | Performed by: INTERNAL MEDICINE

## 2022-05-06 PROCEDURE — 1159F PR MEDICATION LIST DOCUMENTED IN MEDICAL RECORD: ICD-10-PCS | Mod: CPTII,S$GLB,, | Performed by: INTERNAL MEDICINE

## 2022-05-06 PROCEDURE — 1126F AMNT PAIN NOTED NONE PRSNT: CPT | Mod: CPTII,S$GLB,, | Performed by: INTERNAL MEDICINE

## 2022-05-06 PROCEDURE — 99999 PR PBB SHADOW E&M-EST. PATIENT-LVL V: CPT | Mod: PBBFAC,,, | Performed by: INTERNAL MEDICINE

## 2022-05-06 PROCEDURE — 4010F PR ACE/ARB THEARPY RXD/TAKEN: ICD-10-PCS | Mod: CPTII,S$GLB,, | Performed by: INTERNAL MEDICINE

## 2022-05-06 PROCEDURE — 3077F SYST BP >= 140 MM HG: CPT | Mod: CPTII,S$GLB,, | Performed by: INTERNAL MEDICINE

## 2022-05-06 PROCEDURE — 1101F PR PT FALLS ASSESS DOC 0-1 FALLS W/OUT INJ PAST YR: ICD-10-PCS | Mod: CPTII,S$GLB,, | Performed by: INTERNAL MEDICINE

## 2022-05-06 PROCEDURE — 99214 PR OFFICE/OUTPT VISIT, EST, LEVL IV, 30-39 MIN: ICD-10-PCS | Mod: S$GLB,,, | Performed by: INTERNAL MEDICINE

## 2022-05-06 PROCEDURE — 1101F PT FALLS ASSESS-DOCD LE1/YR: CPT | Mod: CPTII,S$GLB,, | Performed by: INTERNAL MEDICINE

## 2022-05-06 PROCEDURE — 1160F RVW MEDS BY RX/DR IN RCRD: CPT | Mod: CPTII,S$GLB,, | Performed by: INTERNAL MEDICINE

## 2022-05-06 PROCEDURE — 3288F FALL RISK ASSESSMENT DOCD: CPT | Mod: CPTII,S$GLB,, | Performed by: INTERNAL MEDICINE

## 2022-05-06 PROCEDURE — 4010F ACE/ARB THERAPY RXD/TAKEN: CPT | Mod: CPTII,S$GLB,, | Performed by: INTERNAL MEDICINE

## 2022-05-06 PROCEDURE — 3008F PR BODY MASS INDEX (BMI) DOCUMENTED: ICD-10-PCS | Mod: CPTII,S$GLB,, | Performed by: INTERNAL MEDICINE

## 2022-05-06 PROCEDURE — 3008F BODY MASS INDEX DOCD: CPT | Mod: CPTII,S$GLB,, | Performed by: INTERNAL MEDICINE

## 2022-05-06 PROCEDURE — 1126F PR PAIN SEVERITY QUANTIFIED, NO PAIN PRESENT: ICD-10-PCS | Mod: CPTII,S$GLB,, | Performed by: INTERNAL MEDICINE

## 2022-05-06 PROCEDURE — 99214 OFFICE O/P EST MOD 30 MIN: CPT | Mod: S$GLB,,, | Performed by: INTERNAL MEDICINE

## 2022-05-06 PROCEDURE — 99999 PR PBB SHADOW E&M-EST. PATIENT-LVL V: ICD-10-PCS | Mod: PBBFAC,,, | Performed by: INTERNAL MEDICINE

## 2022-05-06 PROCEDURE — 3077F PR MOST RECENT SYSTOLIC BLOOD PRESSURE >= 140 MM HG: ICD-10-PCS | Mod: CPTII,S$GLB,, | Performed by: INTERNAL MEDICINE

## 2022-05-06 PROCEDURE — 3079F PR MOST RECENT DIASTOLIC BLOOD PRESSURE 80-89 MM HG: ICD-10-PCS | Mod: CPTII,S$GLB,, | Performed by: INTERNAL MEDICINE

## 2022-05-06 PROCEDURE — 1160F PR REVIEW ALL MEDS BY PRESCRIBER/CLIN PHARMACIST DOCUMENTED: ICD-10-PCS | Mod: CPTII,S$GLB,, | Performed by: INTERNAL MEDICINE

## 2022-05-06 RX ORDER — PANTOPRAZOLE SODIUM 40 MG/1
40 TABLET, DELAYED RELEASE ORAL EVERY MORNING
COMMUNITY

## 2022-05-06 RX ORDER — DOXAZOSIN 2 MG/1
2 TABLET ORAL NIGHTLY
COMMUNITY

## 2022-05-06 RX ORDER — PIOGLITAZONEHYDROCHLORIDE 30 MG/1
30 TABLET ORAL
COMMUNITY

## 2022-05-06 RX ORDER — FINASTERIDE 5 MG/1
5 TABLET, FILM COATED ORAL NIGHTLY
COMMUNITY

## 2022-05-06 RX ORDER — EMPAGLIFLOZIN AND LINAGLIPTIN 25; 5 MG/1; MG/1
TABLET, FILM COATED ORAL EVERY MORNING
COMMUNITY
Start: 2022-04-18

## 2022-05-06 RX ORDER — VALSARTAN 160 MG/1
160 TABLET ORAL NIGHTLY
COMMUNITY

## 2022-05-06 RX ORDER — AMLODIPINE BESYLATE 5 MG/1
5 TABLET ORAL EVERY MORNING
COMMUNITY
Start: 2022-04-19

## 2022-05-06 NOTE — PROGRESS NOTES
Subjective:       Patient ID: Fortunato Alcala is a 72 y.o. male.    Chief Complaint: Follow-up (Patient stated no new problems, just recently pulled muscle in back but is getting better)      Diagnosis:  1. IgG Kappa Multiple Myeloma with 15% plasma cells on bone marrow biopsy.  FISH shows monosomy 13. Clinically consistent with smoldering myeloma.  Baseline M protein of 1.68 g/dL  2. 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 Yuen--he declined the option of plasma exchange.    3. Low back pain secondary to disk herniation.  4. Borderline normocytic anemia-- likely anemia of chronic disease (not meeting criteria for end organ anemia for MM)  5. CKD --thought to be secondary to hypertension and diabetes mellitus rather than myeloma relate    Current Treatment:   Observation for now    Treatment History:  Revlimid 15 mg by mouth daily ×21 days on a every 28 day cycle with weekly dexamethasone 20 mg by mouth weekly x3 Months 4/2016-07/28/16    HPI  Patient began having blurry vision in 2011, underwent workup by multiple specialists, eventually saw Dr. Gamble early in [place], [place].  He also was seen by Neurology, lumbar puncture done showed presence of IgG monoclonal immunoglobulins.  Serum protein electrophoresis confirmed M spike of 1.0.  Further workup started in June of 2014 revealed an M spike of 1.68, elevated kappa/lambda ratio.  Bone marrow biopsy on 07/15/2014 showed 15% monoclonal kappa plasma cells, fish study showed monosomy 13 (standard risk).  MRI on 08/08/2014 and PET/CT scan on 08/08/2014 showed no evidence of disease.  He was then seen at [facility] in September and November of 2014, recommendation was for observation of smoldering myeloma.  He had a repeat bone marrow biopsy on 09/15/2015 that showed stable plasmacytosis representing 12-16%.  The patient eventually was started on Revlimid 15 mg by mouth daily for 21 out of every 28 days with 20 mg of dexamethasone  weekly.  He was treated with this from April of 2016 through July of 2016. He was then placed back on observation, a repeat bone marrow biopsy done on 06/11/2018 showed no evidence of progression with plasma cells estimated to represent 10-12% of the bone marrow.  He was continued on observation.      Interval History:   Patient presents to clinic for scheduled follow up appointment to review MRI results due to left hip pain.  MRI was done due to left hip pain, it showed bursitis with no evidence of disease.  He is feeling good overall aside from continued left hip pain and some pain in some of the fingers on his right hand. The left hip pain worsens with walking and he states that he sometimes hears a pop. He denies any numbness or tingling in left leg.        Past Medical History:   Diagnosis Date    BPH (benign prostatic hyperplasia)     Coronary artery disease     GERD (gastroesophageal reflux disease)     Hypertension     Kidney stones     Mixed hyperlipidemia     Multiple myeloma     Obstructive sleep apnea     Type 2 diabetes mellitus without complications       Past Surgical History:   Procedure Laterality Date    ABDOMINAL AORTIC ANEURYSM REPAIR      KNEE SURGERY      LITHOTRIPSY      SHOULDER SURGERY Left      Social History     Social History Narrative    Not on file      Family History   Problem Relation Age of Onset    Anuerysm Mother     Heart disease Father     Anuerysm Father     Heart attack Father     Hypertension Sister       Review of patient's allergies indicates:   Allergen Reactions    Ketorolac      Other reaction(s): SEVERLY NAUSEATED, SOB    Nitrofurantoin monohyd/m-cryst      Other reaction(s): SEVERLY NAUSEATED    Sulfamethoxazole-trimethoprim Nausea Only     Other reaction(s): SEVERLY NAUSEATED, unknown    Nitrofurantoin Nausea Only      Review of Systems   Constitutional: Negative for appetite change, chills, diaphoresis, fatigue, fever and unexpected weight  change.   HENT: Negative for nasal congestion, facial swelling, mouth sores, nosebleeds and sore throat.    Eyes: Negative for pain and itching.   Respiratory: Negative for cough, chest tightness and shortness of breath.    Cardiovascular: Negative for chest pain, palpitations and leg swelling.   Gastrointestinal: Negative for abdominal distention, abdominal pain, blood in stool, change in bowel habit, nausea, vomiting and change in bowel habit.   Genitourinary: Negative for dysuria, frequency, hematuria and urgency.   Musculoskeletal: Negative for arthralgias, back pain, joint swelling and myalgias.        Left hip pain from bursitis   Integumentary:  Negative for rash and wound.   Neurological: Negative for dizziness, weakness, light-headedness, numbness and headaches.   Hematological: Negative for adenopathy. Does not bruise/bleed easily.   Psychiatric/Behavioral: Negative for agitation and behavioral problems.         Objective:      Physical Exam  Vitals reviewed.   Constitutional:       General: He is awake.      Appearance: Normal appearance.   HENT:      Head: Normocephalic and atraumatic.      Right Ear: Hearing normal.      Left Ear: Hearing normal.      Nose: Nose normal.      Mouth/Throat:      Mouth: Mucous membranes are moist.   Eyes:      General: Lids are normal. Vision grossly intact.      Extraocular Movements: Extraocular movements intact.      Conjunctiva/sclera: Conjunctivae normal.   Cardiovascular:      Rate and Rhythm: Normal rate and regular rhythm.      Pulses: Normal pulses.      Heart sounds: Normal heart sounds.   Pulmonary:      Effort: Pulmonary effort is normal.      Breath sounds: Normal breath sounds. No wheezing, rhonchi or rales.   Chest:   Breasts:      Right: No axillary adenopathy or supraclavicular adenopathy.      Left: No axillary adenopathy or supraclavicular adenopathy.       Abdominal:      General: Bowel sounds are normal.      Palpations: Abdomen is soft.       Tenderness: There is no abdominal tenderness.   Musculoskeletal:      Cervical back: Full passive range of motion without pain and normal range of motion.      Right lower leg: No edema.      Left lower leg: No edema.   Lymphadenopathy:      Cervical: No cervical adenopathy.      Upper Body:      Right upper body: No supraclavicular or axillary adenopathy.      Left upper body: No supraclavicular or axillary adenopathy.   Skin:     General: Skin is warm.   Neurological:      General: No focal deficit present.      Mental Status: He is alert and oriented to person, place, and time.      Cranial Nerves: Cranial nerves are intact.   Psychiatric:         Attention and Perception: Attention normal.         Mood and Affect: Mood and affect normal.         Behavior: Behavior is cooperative.             Assessment:   1. IgG Kappa smoldering multiple myeloma  2. Progressive vision loss of unclear etiology - stable   3. Low back pain secondary to disk herniation.  4. Borderline normocytic anemia-- likely anemia of chronic disease (not meeting criteria for end organ anemia for MM)  5. CKD - thought to be secondary to hypertension and diabetes mellitus rather than myeloma relate  6. Left hip bursitis        Plan:       We will continue with close observation for his smoldering myeloma as per NCCN guidelines    RTC in 3 months with CBC, CMP, and MM labs completed 1 week prior to appointment     Would plan to repeat a bone marrow biopsy if his lab started to worsen.    **If his myeloma shows signs of progression would consider RVD again followed by maintenance Revlimid.  Due to his intolerance of dexamethasone in the past, we will try to get him off of steroids as soon as possible.      Hector Snow II, MD I, Rayne Turner LPN, acted solely as a scribe for and in the presence of, Dr. Hector Snow who performed the service.

## 2022-08-22 ENCOUNTER — LAB VISIT (OUTPATIENT)
Dept: LAB | Facility: HOSPITAL | Age: 72
End: 2022-08-22
Attending: INTERNAL MEDICINE
Payer: MEDICARE

## 2022-08-22 DIAGNOSIS — C90.00 MULTIPLE MYELOMA, REMISSION STATUS UNSPECIFIED: Primary | ICD-10-CM

## 2022-08-22 DIAGNOSIS — M25.559 HIP PAIN: ICD-10-CM

## 2022-08-22 DIAGNOSIS — C90.00 MULTIPLE MYELOMA, REMISSION STATUS UNSPECIFIED: ICD-10-CM

## 2022-08-22 DIAGNOSIS — D64.9 ANEMIA, UNSPECIFIED TYPE: ICD-10-CM

## 2022-08-22 LAB
ALBUMIN SERPL-MCNC: 3.6 GM/DL (ref 3.4–4.8)
ALBUMIN/GLOB SERPL: 1.1 RATIO (ref 1.1–2)
ALP SERPL-CCNC: 45 UNIT/L (ref 40–150)
ALT SERPL-CCNC: 21 UNIT/L (ref 0–55)
AST SERPL-CCNC: 19 UNIT/L (ref 5–34)
BASOPHILS # BLD AUTO: 0.05 X10(3)/MCL (ref 0–0.2)
BASOPHILS NFR BLD AUTO: 0.9 %
BILIRUBIN DIRECT+TOT PNL SERPL-MCNC: 0.5 MG/DL
BUN SERPL-MCNC: 16.6 MG/DL (ref 8.4–25.7)
CALCIUM SERPL-MCNC: 10.1 MG/DL (ref 8.8–10)
CHLORIDE SERPL-SCNC: 109 MMOL/L (ref 98–107)
CO2 SERPL-SCNC: 24 MMOL/L (ref 23–31)
CREAT SERPL-MCNC: 0.92 MG/DL (ref 0.73–1.18)
EOSINOPHIL # BLD AUTO: 0.05 X10(3)/MCL (ref 0–0.9)
EOSINOPHIL NFR BLD AUTO: 0.9 %
ERYTHROCYTE [DISTWIDTH] IN BLOOD BY AUTOMATED COUNT: 13 % (ref 11.5–17)
GFR SERPLBLD CREATININE-BSD FMLA CKD-EPI: >60 MLS/MIN/1.73/M2
GLOBULIN SER-MCNC: 3.4 GM/DL (ref 2.4–3.5)
GLUCOSE SERPL-MCNC: 78 MG/DL (ref 82–115)
HCT VFR BLD AUTO: 39.8 % (ref 42–52)
HGB BLD-MCNC: 12.8 GM/DL (ref 14–18)
IGA SERPL-MCNC: 59 MG/DL (ref 101–645)
IGG SERPL-MCNC: 1714 MG/DL (ref 540–1822)
IGM SERPL-MCNC: 21 MG/DL (ref 22–240)
IMM GRANULOCYTES # BLD AUTO: 0.01 X10(3)/MCL (ref 0–0.04)
IMM GRANULOCYTES NFR BLD AUTO: 0.2 %
LYMPHOCYTES # BLD AUTO: 1.36 X10(3)/MCL (ref 0.6–4.6)
LYMPHOCYTES NFR BLD AUTO: 23.6 %
MCH RBC QN AUTO: 30.3 PG (ref 27–31)
MCHC RBC AUTO-ENTMCNC: 32.2 MG/DL (ref 33–36)
MCV RBC AUTO: 94.3 FL (ref 80–94)
MONOCYTES # BLD AUTO: 0.43 X10(3)/MCL (ref 0.1–1.3)
MONOCYTES NFR BLD AUTO: 7.5 %
NEUTROPHILS # BLD AUTO: 3.9 X10(3)/MCL (ref 2.1–9.2)
NEUTROPHILS NFR BLD AUTO: 66.9 %
PLATELET # BLD AUTO: 213 X10(3)/MCL (ref 130–400)
PMV BLD AUTO: 9.5 FL (ref 7.4–10.4)
POTASSIUM SERPL-SCNC: 4.6 MMOL/L (ref 3.5–5.1)
PROT SERPL-MCNC: 7 GM/DL (ref 5.8–7.6)
RBC # BLD AUTO: 4.22 X10(6)/MCL (ref 4.7–6.1)
SODIUM SERPL-SCNC: 139 MMOL/L (ref 136–145)
WBC # SPEC AUTO: 5.8 X10(3)/MCL (ref 4.5–11.5)

## 2022-08-22 PROCEDURE — 36415 COLL VENOUS BLD VENIPUNCTURE: CPT

## 2022-08-22 PROCEDURE — 83883 ASSAY NEPHELOMETRY NOT SPEC: CPT | Mod: 91

## 2022-08-22 PROCEDURE — 80053 COMPREHEN METABOLIC PANEL: CPT

## 2022-08-22 PROCEDURE — 85025 COMPLETE CBC W/AUTO DIFF WBC: CPT

## 2022-08-22 PROCEDURE — 82784 ASSAY IGA/IGD/IGG/IGM EACH: CPT

## 2022-08-23 LAB
KAPPA LC FREE SER-MCNC: 12.5 MG/DL (ref 0.33–1.94)
KAPPA LC FREE/LAMBDA FREE SER: 15.1 {RATIO} (ref 0.26–1.65)
LAMBDA LC FREE SERPL-MCNC: 0.83 MG/DL (ref 0.57–2.63)

## 2022-08-24 ENCOUNTER — APPOINTMENT (OUTPATIENT)
Dept: LAB | Facility: HOSPITAL | Age: 72
End: 2022-08-24
Attending: INTERNAL MEDICINE
Payer: MEDICARE

## 2022-08-24 PROCEDURE — 83520 IMMUNOASSAY QUANT NOS NONAB: CPT | Performed by: INTERNAL MEDICINE

## 2022-08-24 PROCEDURE — 82043 UR ALBUMIN QUANTITATIVE: CPT | Performed by: INTERNAL MEDICINE

## 2022-08-24 PROCEDURE — 86334 IMMUNOFIX E-PHORESIS SERUM: CPT | Performed by: INTERNAL MEDICINE

## 2022-08-29 ENCOUNTER — OFFICE VISIT (OUTPATIENT)
Dept: HEMATOLOGY/ONCOLOGY | Facility: CLINIC | Age: 72
End: 2022-08-29
Payer: MEDICARE

## 2022-08-29 VITALS
WEIGHT: 257.06 LBS | HEART RATE: 59 BPM | HEIGHT: 67 IN | RESPIRATION RATE: 18 BRPM | DIASTOLIC BLOOD PRESSURE: 67 MMHG | OXYGEN SATURATION: 96 % | SYSTOLIC BLOOD PRESSURE: 139 MMHG | TEMPERATURE: 99 F | BODY MASS INDEX: 40.35 KG/M2

## 2022-08-29 DIAGNOSIS — D47.2 SMOLDERING MULTIPLE MYELOMA: ICD-10-CM

## 2022-08-29 DIAGNOSIS — D47.2 SMOLDERING MULTIPLE MYELOMA (SMM): Primary | ICD-10-CM

## 2022-08-29 PROBLEM — G47.30 SLEEP APNEA: Status: ACTIVE | Noted: 2022-08-29

## 2022-08-29 PROBLEM — C90.00 MULTIPLE MYELOMA: Status: ACTIVE | Noted: 2019-12-11

## 2022-08-29 PROBLEM — H47.019 ISCHEMIC OPTIC NEUROPATHY: Status: ACTIVE | Noted: 2019-12-11

## 2022-08-29 PROBLEM — J44.9 CHRONIC OBSTRUCTIVE PULMONARY DISEASE: Status: ACTIVE | Noted: 2019-12-11

## 2022-08-29 PROBLEM — E78.5 HYPERLIPIDEMIA: Status: ACTIVE | Noted: 2022-08-29

## 2022-08-29 PROBLEM — E11.9 DIABETES MELLITUS: Status: ACTIVE | Noted: 2022-08-29

## 2022-08-29 PROBLEM — I25.10 ARTERIOSCLEROSIS OF CORONARY ARTERY: Status: ACTIVE | Noted: 2019-12-11

## 2022-08-29 PROBLEM — I10 HYPERTENSION: Status: ACTIVE | Noted: 2022-08-29

## 2022-08-29 PROBLEM — D12.3 ADENOMATOUS POLYP OF TRANSVERSE COLON: Status: ACTIVE | Noted: 2021-04-13

## 2022-08-29 PROCEDURE — 3075F PR MOST RECENT SYSTOLIC BLOOD PRESS GE 130-139MM HG: ICD-10-PCS | Mod: CPTII,S$GLB,, | Performed by: NURSE PRACTITIONER

## 2022-08-29 PROCEDURE — 4010F ACE/ARB THERAPY RXD/TAKEN: CPT | Mod: CPTII,S$GLB,, | Performed by: NURSE PRACTITIONER

## 2022-08-29 PROCEDURE — 1160F RVW MEDS BY RX/DR IN RCRD: CPT | Mod: CPTII,S$GLB,, | Performed by: NURSE PRACTITIONER

## 2022-08-29 PROCEDURE — 1160F PR REVIEW ALL MEDS BY PRESCRIBER/CLIN PHARMACIST DOCUMENTED: ICD-10-PCS | Mod: CPTII,S$GLB,, | Performed by: NURSE PRACTITIONER

## 2022-08-29 PROCEDURE — 99214 OFFICE O/P EST MOD 30 MIN: CPT | Mod: S$GLB,,, | Performed by: NURSE PRACTITIONER

## 2022-08-29 PROCEDURE — 99214 PR OFFICE/OUTPT VISIT, EST, LEVL IV, 30-39 MIN: ICD-10-PCS | Mod: S$GLB,,, | Performed by: NURSE PRACTITIONER

## 2022-08-29 PROCEDURE — 3288F FALL RISK ASSESSMENT DOCD: CPT | Mod: CPTII,S$GLB,, | Performed by: NURSE PRACTITIONER

## 2022-08-29 PROCEDURE — 1159F PR MEDICATION LIST DOCUMENTED IN MEDICAL RECORD: ICD-10-PCS | Mod: CPTII,S$GLB,, | Performed by: NURSE PRACTITIONER

## 2022-08-29 PROCEDURE — 3075F SYST BP GE 130 - 139MM HG: CPT | Mod: CPTII,S$GLB,, | Performed by: NURSE PRACTITIONER

## 2022-08-29 PROCEDURE — 1101F PT FALLS ASSESS-DOCD LE1/YR: CPT | Mod: CPTII,S$GLB,, | Performed by: NURSE PRACTITIONER

## 2022-08-29 PROCEDURE — 1126F PR PAIN SEVERITY QUANTIFIED, NO PAIN PRESENT: ICD-10-PCS | Mod: CPTII,S$GLB,, | Performed by: NURSE PRACTITIONER

## 2022-08-29 PROCEDURE — 4010F PR ACE/ARB THEARPY RXD/TAKEN: ICD-10-PCS | Mod: CPTII,S$GLB,, | Performed by: NURSE PRACTITIONER

## 2022-08-29 PROCEDURE — 99999 PR PBB SHADOW E&M-EST. PATIENT-LVL V: ICD-10-PCS | Mod: PBBFAC,,, | Performed by: NURSE PRACTITIONER

## 2022-08-29 PROCEDURE — 1126F AMNT PAIN NOTED NONE PRSNT: CPT | Mod: CPTII,S$GLB,, | Performed by: NURSE PRACTITIONER

## 2022-08-29 PROCEDURE — 3288F PR FALLS RISK ASSESSMENT DOCUMENTED: ICD-10-PCS | Mod: CPTII,S$GLB,, | Performed by: NURSE PRACTITIONER

## 2022-08-29 PROCEDURE — 3078F PR MOST RECENT DIASTOLIC BLOOD PRESSURE < 80 MM HG: ICD-10-PCS | Mod: CPTII,S$GLB,, | Performed by: NURSE PRACTITIONER

## 2022-08-29 PROCEDURE — 99999 PR PBB SHADOW E&M-EST. PATIENT-LVL V: CPT | Mod: PBBFAC,,, | Performed by: NURSE PRACTITIONER

## 2022-08-29 PROCEDURE — 3008F PR BODY MASS INDEX (BMI) DOCUMENTED: ICD-10-PCS | Mod: CPTII,S$GLB,, | Performed by: NURSE PRACTITIONER

## 2022-08-29 PROCEDURE — 1101F PR PT FALLS ASSESS DOC 0-1 FALLS W/OUT INJ PAST YR: ICD-10-PCS | Mod: CPTII,S$GLB,, | Performed by: NURSE PRACTITIONER

## 2022-08-29 PROCEDURE — 3008F BODY MASS INDEX DOCD: CPT | Mod: CPTII,S$GLB,, | Performed by: NURSE PRACTITIONER

## 2022-08-29 PROCEDURE — 3078F DIAST BP <80 MM HG: CPT | Mod: CPTII,S$GLB,, | Performed by: NURSE PRACTITIONER

## 2022-08-29 PROCEDURE — 1159F MED LIST DOCD IN RCRD: CPT | Mod: CPTII,S$GLB,, | Performed by: NURSE PRACTITIONER

## 2022-08-29 NOTE — PROGRESS NOTES
Subjective:       Patient ID: Fortunato Alcala is a 72 y.o. male.    Chief Complaint: Follow-up (Patient stated that he has pain in feet and left hip. Patient stated he often gets lost/confused at times. )      Diagnosis:  1. IgG Kappa Multiple Myeloma with 15% plasma cells on bone marrow biopsy.  FISH shows monosomy 13. Clinically consistent with smoldering myeloma.  Baseline M protein of 1.68 g/dL  2. 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 Yuen--he declined the option of plasma exchange.    3. Low back pain secondary to disk herniation.  4. Borderline normocytic anemia-- likely anemia of chronic disease (not meeting criteria for end organ anemia for MM)  5. CKD --thought to be secondary to hypertension and diabetes mellitus rather than myeloma relate    Current Treatment:   Observation for now    Treatment History:  Revlimid 15 mg by mouth daily ×21 days on a every 28 day cycle with weekly dexamethasone 20 mg by mouth weekly x3 Months 4/2016-07/28/16    HPI  Patient began having blurry vision in 2011, underwent workup by multiple specialists, eventually saw Dr. Gamble early in [place], [place].  He also was seen by Neurology, lumbar puncture done showed presence of IgG monoclonal immunoglobulins.  Serum protein electrophoresis confirmed M spike of 1.0.  Further workup started in June of 2014 revealed an M spike of 1.68, elevated kappa/lambda ratio.  Bone marrow biopsy on 07/15/2014 showed 15% monoclonal kappa plasma cells, fish study showed monosomy 13 (standard risk).  MRI on 08/08/2014 and PET/CT scan on 08/08/2014 showed no evidence of disease.  He was then seen at [facility] in September and November of 2014, recommendation was for observation of smoldering myeloma.  He had a repeat bone marrow biopsy on 09/15/2015 that showed stable plasmacytosis representing 12-16%.  The patient eventually was started on Revlimid 15 mg by mouth daily for 21 out of every 28 days with 20 mg of  dexamethasone weekly.  He was treated with this from April of 2016 through July of 2016. He was then placed back on observation, a repeat bone marrow biopsy done on 06/11/2018 showed no evidence of progression with plasma cells estimated to represent 10-12% of the bone marrow.  He was continued on observation.      Interval History:   Patient presents to clinic for scheduled follow up appointment for smoldering myeloma, accompanied by his wife.  He is feeling good overall aside from continued left hip pain and some pain in feet. He also reports some occasional dizziness which is likely related to his blood sugar and some forgetfulness. He denies any new areas of pain.      Past Medical History:   Diagnosis Date    BPH (benign prostatic hyperplasia)     Coronary artery disease     GERD (gastroesophageal reflux disease)     Hypertension     Kidney stones     Mixed hyperlipidemia     Multiple myeloma     Obstructive sleep apnea     Type 2 diabetes mellitus without complications       Past Surgical History:   Procedure Laterality Date    ABDOMINAL AORTIC ANEURYSM REPAIR      KNEE SURGERY      LITHOTRIPSY      SHOULDER SURGERY Left      Social History     Social History Narrative    Not on file      Family History   Problem Relation Age of Onset    Anuerysm Mother     Heart disease Father     Anuerysm Father     Heart attack Father     Hypertension Sister       Review of patient's allergies indicates:   Allergen Reactions    Ketorolac      Other reaction(s): SEVERLY NAUSEATED, SOB    Nitrofurantoin monohyd/m-cryst      Other reaction(s): SEVERLY NAUSEATED    Sulfamethoxazole-trimethoprim Nausea Only     Other reaction(s): SEVERLY NAUSEATED, unknown    Nitrofurantoin Nausea Only      Review of Systems   Constitutional:  Positive for fatigue. Negative for appetite change, chills, diaphoresis, fever and unexpected weight change.   HENT:  Negative for nasal congestion, facial swelling, mouth sores, nosebleeds and sore  throat.    Eyes:  Negative for pain and itching.   Respiratory:  Negative for cough, chest tightness and shortness of breath.    Cardiovascular:  Negative for chest pain, palpitations and leg swelling.   Gastrointestinal:  Negative for abdominal distention, abdominal pain, blood in stool, change in bowel habit, nausea, vomiting and change in bowel habit.   Genitourinary:  Negative for dysuria, frequency, hematuria and urgency.   Musculoskeletal:  Negative for arthralgias, back pain, joint swelling and myalgias.        Left hip pain from bursitis   Integumentary:  Negative for rash and wound.   Neurological:  Positive for dizziness. Negative for weakness, light-headedness and headaches.   Hematological:  Negative for adenopathy. Does not bruise/bleed easily.   Psychiatric/Behavioral:  Negative for agitation and behavioral problems.        Objective:      Physical Exam  Vitals reviewed.   Constitutional:       General: He is awake.      Appearance: Normal appearance.   HENT:      Head: Normocephalic and atraumatic.      Right Ear: Hearing normal.      Left Ear: Hearing normal.      Nose: Nose normal.   Eyes:      General: Lids are normal. Vision grossly intact.      Extraocular Movements: Extraocular movements intact.      Conjunctiva/sclera: Conjunctivae normal.   Cardiovascular:      Rate and Rhythm: Normal rate and regular rhythm.      Pulses: Normal pulses.      Heart sounds: Normal heart sounds.   Pulmonary:      Effort: Pulmonary effort is normal.      Breath sounds: Normal breath sounds. No wheezing, rhonchi or rales.   Abdominal:      Palpations: Abdomen is soft.   Musculoskeletal:      Cervical back: Full passive range of motion without pain and normal range of motion.      Right lower leg: No edema.      Left lower leg: No edema.   Skin:     General: Skin is warm.   Neurological:      General: No focal deficit present.      Mental Status: He is alert and oriented to person, place, and time.   Psychiatric:          Attention and Perception: Attention normal.         Mood and Affect: Mood and affect normal.         Behavior: Behavior is cooperative.           Assessment:   1. IgG Kappa smoldering multiple myeloma  2. Progressive vision loss of unclear etiology - stable   3. Low back pain secondary to disk herniation.  4. Borderline normocytic anemia-- likely anemia of chronic disease (not meeting criteria for end organ anemia for MM)  5. CKD - thought to be secondary to hypertension and diabetes mellitus rather than myeloma relate  6. Left hip bursitis        Plan:       We will continue with close observation for his smoldering myeloma as per NCCN guidelines    Recommended he discuss forgetfulness and neuropathy with his PCP    RTC in 3 months with CBC, CMP, and MM labs completed 1 week prior to appointment     Would plan to repeat a bone marrow biopsy if his lab started to worsen.    **If his myeloma shows signs of progression would consider RVD again followed by maintenance Revlimid.  Due to his intolerance of dexamethasone in the past, we will try to get him off of steroids as soon as possible.    MONICA Lerma-C

## 2022-08-30 LAB
ALBUMIN 24H UR ELPH-MRATE: 104.4 MG/24 H
ALBUMIN/GLOB 24H UR ELPH: 1.38 {RATIO}
ALPHA1 GLOB 24H UR ELPH-MRATE: 10.8 MG/24 H
ALPHA2 GLOB 24H UR ELPH-MRATE: 10.8 MG/24 H
B-GLOBULIN 24H UR ELPH-MRATE: 37.8 MG/24 H
COLLECT DURATION TIME UR: 24 H
GAMMA GLOB 24H UR ELPH-MRATE: 16.2 MG/24 H
PROT 24H UR-MRATE: 180 MG/24 H
PROT PATTERN 24H UR ELPH-IMP: NORMAL
SPECIMEN VOL ?TM UR: 1800 ML

## 2022-09-02 LAB
INTERPRETATION 24H UR IFE-IMP: ABNORMAL
M IMMUNOFIXATION FLAG, 24 HR, U: POSITIVE

## 2022-10-05 ENCOUNTER — DOCUMENTATION ONLY (OUTPATIENT)
Dept: ADMINISTRATIVE | Facility: HOSPITAL | Age: 72
End: 2022-10-05
Payer: MEDICARE

## 2022-10-14 LAB
ALBUMIN SERPL ELPH-MCNC: 3.44 G/DL
ALPHA1 GLOB SERPL ELPH-MCNC: 0.33 G/DL
ALPHA2 GLOB SERPL ELPH-MCNC: 0.87 G/DL
AR EER MONOCLONAL PROTEIN AND FLC, SERUM: NORMAL
B-GLOBULIN SERPL ELPH-MCNC: 2.04 G/DL
GAMMA GLOB SERPL ELPH-MCNC: 0.32 G/DL
IGA SERPL-MCNC: 53 MG/DL
IGG SERPL-MCNC: 1535 MG/DL
IGM SERPL-MCNC: 22 MG/DL
INTERPRETATION SERPL IFE-IMP: NORMAL
INTERPRETATION SERPL IFE-IMP: NORMAL
KAPPA LC FREE SER-MCNC: 126.15 MG/L
KAPPA LC FREE/LAMBDA FREE SER NEPH: 9.08 {RATIO}
LAMBDA LC FREE SERPL-MCNC: 13.9 MG/L
PROT SERPL-MCNC: 7 G/DL

## 2022-12-30 ENCOUNTER — LAB VISIT (OUTPATIENT)
Dept: LAB | Facility: HOSPITAL | Age: 72
End: 2022-12-30
Attending: INTERNAL MEDICINE
Payer: MEDICARE

## 2022-12-30 DIAGNOSIS — D47.2 SMOLDERING MULTIPLE MYELOMA (SMM): Primary | ICD-10-CM

## 2022-12-30 DIAGNOSIS — D47.2 SMOLDERING MULTIPLE MYELOMA (SMM): ICD-10-CM

## 2022-12-30 LAB
ALBUMIN SERPL-MCNC: 3.5 G/DL (ref 3.4–4.8)
ALBUMIN/GLOB SERPL: 1 RATIO (ref 1.1–2)
ALP SERPL-CCNC: 47 UNIT/L (ref 40–150)
ALT SERPL-CCNC: 15 UNIT/L (ref 0–55)
AST SERPL-CCNC: 19 UNIT/L (ref 5–34)
BASOPHILS # BLD AUTO: 0.03 X10(3)/MCL (ref 0–0.2)
BASOPHILS NFR BLD AUTO: 0.5 %
BILIRUBIN DIRECT+TOT PNL SERPL-MCNC: 0.5 MG/DL
BUN SERPL-MCNC: 16.2 MG/DL (ref 8.4–25.7)
CALCIUM SERPL-MCNC: 10 MG/DL (ref 8.8–10)
CHLORIDE SERPL-SCNC: 109 MMOL/L (ref 98–107)
CO2 SERPL-SCNC: 23 MMOL/L (ref 23–31)
CREAT SERPL-MCNC: 0.98 MG/DL (ref 0.73–1.18)
EOSINOPHIL # BLD AUTO: 0.05 X10(3)/MCL (ref 0–0.9)
EOSINOPHIL NFR BLD AUTO: 0.9 %
ERYTHROCYTE [DISTWIDTH] IN BLOOD BY AUTOMATED COUNT: 12.5 % (ref 11.6–14.4)
GFR SERPLBLD CREATININE-BSD FMLA CKD-EPI: >60 MLS/MIN/1.73/M2
GLOBULIN SER-MCNC: 3.6 GM/DL (ref 2.4–3.5)
GLUCOSE SERPL-MCNC: 110 MG/DL (ref 82–115)
HCT VFR BLD AUTO: 40.5 % (ref 42–52)
HGB BLD-MCNC: 12.9 GM/DL (ref 14–18)
IGA SERPL-MCNC: 56 MG/DL (ref 101–645)
IGG SERPL-MCNC: 1738 MG/DL (ref 540–1822)
IGM SERPL-MCNC: 25 MG/DL (ref 22–240)
IMM GRANULOCYTES # BLD AUTO: 0.01 X10(3)/MCL (ref 0–0.04)
IMM GRANULOCYTES NFR BLD AUTO: 0.2 %
LYMPHOCYTES # BLD AUTO: 0.98 X10(3)/MCL (ref 0.6–4.6)
LYMPHOCYTES NFR BLD AUTO: 17.9 %
MCH RBC QN AUTO: 29.7 PG
MCHC RBC AUTO-ENTMCNC: 31.9 MG/DL (ref 33–36)
MCV RBC AUTO: 93.1 FL (ref 80–94)
MONOCYTES # BLD AUTO: 0.32 X10(3)/MCL (ref 0.1–1.3)
MONOCYTES NFR BLD AUTO: 5.9 %
NEUTROPHILS # BLD AUTO: 4.08 X10(3)/MCL (ref 2.1–9.2)
NEUTROPHILS NFR BLD AUTO: 74.6 %
PLATELET # BLD AUTO: 224 X10(3)/MCL (ref 140–371)
PMV BLD AUTO: 9.4 FL (ref 9.4–12.4)
POTASSIUM SERPL-SCNC: 4 MMOL/L (ref 3.5–5.1)
PROT SERPL-MCNC: 7.1 GM/DL (ref 5.8–7.6)
RBC # BLD AUTO: 4.35 X10(6)/MCL (ref 4.7–6.1)
SODIUM SERPL-SCNC: 143 MMOL/L (ref 136–145)
WBC # SPEC AUTO: 5.5 X10(3)/MCL (ref 4.5–11.5)

## 2022-12-30 PROCEDURE — 82784 ASSAY IGA/IGD/IGG/IGM EACH: CPT

## 2022-12-30 PROCEDURE — 83521 IG LIGHT CHAINS FREE EACH: CPT

## 2022-12-30 PROCEDURE — 84166 PROTEIN E-PHORESIS/URINE/CSF: CPT

## 2022-12-30 PROCEDURE — 85025 COMPLETE CBC W/AUTO DIFF WBC: CPT

## 2022-12-30 PROCEDURE — 80053 COMPREHEN METABOLIC PANEL: CPT

## 2022-12-30 PROCEDURE — 36415 COLL VENOUS BLD VENIPUNCTURE: CPT

## 2022-12-30 PROCEDURE — 86334 IMMUNOFIX E-PHORESIS SERUM: CPT

## 2022-12-30 PROCEDURE — 84165 PROTEIN E-PHORESIS SERUM: CPT

## 2023-01-03 LAB
ALBUMIN % SPEP (OHS): 44.19
ALBUMIN SERPL-MCNC: 3.2 G/DL (ref 3.4–4.8)
ALBUMIN/GLOB SERPL: 0.8 RATIO (ref 1.1–2)
ALPHA 1 GLOB (OHS): 0.29 GM/DL
ALPHA 1 GLOB% (OHS): 4
ALPHA 2 GLOB % (OHS): 15.04
ALPHA 2 GLOB (OHS): 1.08 GM/DL
BETA GLOB (OHS): 2.34 GM/DL
BETA GLOB% (OHS): 32.45
GAMMA GLOBULIN % (OHS): 4.32
GAMMA GLOBULIN (OHS): 0.31 GM/DL
GLOBULIN SER-MCNC: 4 GM/DL (ref 2.4–3.5)
KAPPA LC FREE SER-MCNC: 12.5 MG/DL (ref 0.33–1.94)
KAPPA LC FREE/LAMBDA FREE SER: 13.2 {RATIO} (ref 0.26–1.65)
LAMBDA LC FREE SERPL-MCNC: 0.95 MG/DL (ref 0.57–2.63)
M SPIKE % (OHS): 5.83
M SPIKE (OHS): 0.42 GM/DL
PATH REV: NORMAL
PROT SERPL-MCNC: 7.2 GM/DL (ref 5.8–7.6)

## 2023-01-04 ENCOUNTER — OFFICE VISIT (OUTPATIENT)
Dept: HEMATOLOGY/ONCOLOGY | Facility: CLINIC | Age: 73
End: 2023-01-04
Payer: MEDICARE

## 2023-01-04 VITALS
RESPIRATION RATE: 18 BRPM | OXYGEN SATURATION: 96 % | HEART RATE: 60 BPM | HEIGHT: 67 IN | WEIGHT: 259.69 LBS | SYSTOLIC BLOOD PRESSURE: 151 MMHG | BODY MASS INDEX: 40.76 KG/M2 | DIASTOLIC BLOOD PRESSURE: 79 MMHG

## 2023-01-04 DIAGNOSIS — C90.00 MULTIPLE MYELOMA, REMISSION STATUS UNSPECIFIED: Primary | ICD-10-CM

## 2023-01-04 PROCEDURE — 1159F PR MEDICATION LIST DOCUMENTED IN MEDICAL RECORD: ICD-10-PCS | Mod: CPTII,S$GLB,, | Performed by: INTERNAL MEDICINE

## 2023-01-04 PROCEDURE — 3008F BODY MASS INDEX DOCD: CPT | Mod: CPTII,S$GLB,, | Performed by: INTERNAL MEDICINE

## 2023-01-04 PROCEDURE — 1101F PR PT FALLS ASSESS DOC 0-1 FALLS W/OUT INJ PAST YR: ICD-10-PCS | Mod: CPTII,S$GLB,, | Performed by: INTERNAL MEDICINE

## 2023-01-04 PROCEDURE — 99213 OFFICE O/P EST LOW 20 MIN: CPT | Mod: S$GLB,,, | Performed by: INTERNAL MEDICINE

## 2023-01-04 PROCEDURE — 99213 PR OFFICE/OUTPT VISIT, EST, LEVL III, 20-29 MIN: ICD-10-PCS | Mod: S$GLB,,, | Performed by: INTERNAL MEDICINE

## 2023-01-04 PROCEDURE — 3077F SYST BP >= 140 MM HG: CPT | Mod: CPTII,S$GLB,, | Performed by: INTERNAL MEDICINE

## 2023-01-04 PROCEDURE — 3078F DIAST BP <80 MM HG: CPT | Mod: CPTII,S$GLB,, | Performed by: INTERNAL MEDICINE

## 2023-01-04 PROCEDURE — 1101F PT FALLS ASSESS-DOCD LE1/YR: CPT | Mod: CPTII,S$GLB,, | Performed by: INTERNAL MEDICINE

## 2023-01-04 PROCEDURE — 3008F PR BODY MASS INDEX (BMI) DOCUMENTED: ICD-10-PCS | Mod: CPTII,S$GLB,, | Performed by: INTERNAL MEDICINE

## 2023-01-04 PROCEDURE — 99999 PR PBB SHADOW E&M-EST. PATIENT-LVL V: ICD-10-PCS | Mod: PBBFAC,,, | Performed by: INTERNAL MEDICINE

## 2023-01-04 PROCEDURE — 3288F FALL RISK ASSESSMENT DOCD: CPT | Mod: CPTII,S$GLB,, | Performed by: INTERNAL MEDICINE

## 2023-01-04 PROCEDURE — 3077F PR MOST RECENT SYSTOLIC BLOOD PRESSURE >= 140 MM HG: ICD-10-PCS | Mod: CPTII,S$GLB,, | Performed by: INTERNAL MEDICINE

## 2023-01-04 PROCEDURE — 3078F PR MOST RECENT DIASTOLIC BLOOD PRESSURE < 80 MM HG: ICD-10-PCS | Mod: CPTII,S$GLB,, | Performed by: INTERNAL MEDICINE

## 2023-01-04 PROCEDURE — 1160F PR REVIEW ALL MEDS BY PRESCRIBER/CLIN PHARMACIST DOCUMENTED: ICD-10-PCS | Mod: CPTII,S$GLB,, | Performed by: INTERNAL MEDICINE

## 2023-01-04 PROCEDURE — 3288F PR FALLS RISK ASSESSMENT DOCUMENTED: ICD-10-PCS | Mod: CPTII,S$GLB,, | Performed by: INTERNAL MEDICINE

## 2023-01-04 PROCEDURE — 1159F MED LIST DOCD IN RCRD: CPT | Mod: CPTII,S$GLB,, | Performed by: INTERNAL MEDICINE

## 2023-01-04 PROCEDURE — 99999 PR PBB SHADOW E&M-EST. PATIENT-LVL V: CPT | Mod: PBBFAC,,, | Performed by: INTERNAL MEDICINE

## 2023-01-04 PROCEDURE — 1126F PR PAIN SEVERITY QUANTIFIED, NO PAIN PRESENT: ICD-10-PCS | Mod: CPTII,S$GLB,, | Performed by: INTERNAL MEDICINE

## 2023-01-04 PROCEDURE — 1126F AMNT PAIN NOTED NONE PRSNT: CPT | Mod: CPTII,S$GLB,, | Performed by: INTERNAL MEDICINE

## 2023-01-04 PROCEDURE — 1160F RVW MEDS BY RX/DR IN RCRD: CPT | Mod: CPTII,S$GLB,, | Performed by: INTERNAL MEDICINE

## 2023-01-04 NOTE — PROGRESS NOTES
Subjective:       Patient ID: Fortunato Alcala is a 72 y.o. male.    Chief Complaint: Follow-up (Patient stated that his left hip hurts when he starts to walk and he is also having stomach issues)        Diagnosis:  1. IgG Kappa Multiple Myeloma with 15% plasma cells on bone marrow biopsy.  FISH shows monosomy 13. Clinically consistent with smoldering myeloma.  Baseline M protein of 1.68 g/dL  2. 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 Yune--he declined the option of plasma exchange.    3. Low back pain secondary to disk herniation.  4. Borderline normocytic anemia-- likely anemia of chronic disease (not meeting criteria for end organ anemia for MM)  5. CKD --thought to be secondary to hypertension and diabetes mellitus rather than myeloma relate    Current Treatment:   Observation for now    Treatment History:  Revlimid 15 mg by mouth daily ×21 days on a every 28 day cycle with weekly dexamethasone 20 mg by mouth weekly x3 Months 4/2016-07/28/16    HPI:  Patient began having blurry vision in 2011, underwent workup by multiple specialists, eventually saw Dr. Gamble early in [place], [place].  He also was seen by Neurology, lumbar puncture done showed presence of IgG monoclonal immunoglobulins.  Serum protein electrophoresis confirmed M spike of 1.0.  Further workup started in June of 2014 revealed an M spike of 1.68, elevated kappa/lambda ratio.  Bone marrow biopsy on 07/15/2014 showed 15% monoclonal kappa plasma cells, fish study showed monosomy 13 (standard risk).  MRI on 08/08/2014 and PET/CT scan on 08/08/2014 showed no evidence of disease.  He was then seen at [facility] in September and November of 2014, recommendation was for observation of smoldering myeloma.  He had a repeat bone marrow biopsy on 09/15/2015 that showed stable plasmacytosis representing 12-16%.  The patient eventually was started on Revlimid 15 mg by mouth daily for 21 out of every 28 days with 20 mg of  dexamethasone weekly.  He was treated with this from April of 2016 through July of 2016. He was then placed back on observation, a repeat bone marrow biopsy done on 06/11/2018 showed no evidence of progression with plasma cells estimated to represent 10-12% of the bone marrow.  He was continued on observation.      Interval History:   Patient presents to clinic for scheduled follow up appointment for smoldering myeloma, accompanied by his wife.  He is feeling good overall.  He and his wife did just get over COVID-19 infection.  He is feeling better.  He has no major issues to discuss.      Past Medical History:   Diagnosis Date    BPH (benign prostatic hyperplasia)     Coronary artery disease     GERD (gastroesophageal reflux disease)     Hypertension     Kidney stones     Mixed hyperlipidemia     Multiple myeloma     Obstructive sleep apnea     Type 2 diabetes mellitus without complications       Past Surgical History:   Procedure Laterality Date    ABDOMINAL AORTIC ANEURYSM REPAIR      COLONOSCOPY  06/27/2017    KNEE SURGERY      LITHOTRIPSY      SHOULDER SURGERY Left      Social History     Social History Narrative    Not on file      Family History   Problem Relation Age of Onset    Anuerysm Mother     Heart disease Father     Anuerysm Father     Heart attack Father     Hypertension Sister       Review of patient's allergies indicates:   Allergen Reactions    Ketorolac      Other reaction(s): SEVERLY NAUSEATED, SOB    Nitrofurantoin monohyd/m-cryst      Other reaction(s): SEVERLY NAUSEATED    Sulfamethoxazole-trimethoprim Nausea Only     Other reaction(s): SEVERLY NAUSEATED, unknown    Nitrofurantoin Nausea Only      Review of Systems   Constitutional:  Positive for fatigue. Negative for appetite change, chills, diaphoresis, fever and unexpected weight change.   HENT:  Negative for nasal congestion, facial swelling, mouth sores, nosebleeds and sore throat.    Eyes:  Negative for pain and itching.   Respiratory:   Negative for cough, chest tightness and shortness of breath.    Cardiovascular:  Negative for chest pain, palpitations and leg swelling.   Gastrointestinal:  Negative for abdominal distention, abdominal pain, blood in stool, change in bowel habit, nausea, vomiting and change in bowel habit.   Genitourinary:  Negative for dysuria, frequency, hematuria and urgency.   Musculoskeletal:  Negative for arthralgias, back pain, joint swelling and myalgias.        Left hip pain from bursitis   Integumentary:  Negative for rash and wound.   Neurological:  Positive for dizziness. Negative for weakness, light-headedness and headaches.   Hematological:  Negative for adenopathy. Does not bruise/bleed easily.   Psychiatric/Behavioral:  Negative for agitation and behavioral problems.        Objective:      Physical Exam  Vitals reviewed.   Constitutional:       General: He is awake.      Appearance: Normal appearance.   HENT:      Head: Normocephalic and atraumatic.      Right Ear: Hearing normal.      Left Ear: Hearing normal.      Nose: Nose normal.   Eyes:      General: Lids are normal. Vision grossly intact.      Extraocular Movements: Extraocular movements intact.      Conjunctiva/sclera: Conjunctivae normal.   Cardiovascular:      Rate and Rhythm: Normal rate and regular rhythm.      Pulses: Normal pulses.      Heart sounds: Normal heart sounds.   Pulmonary:      Effort: Pulmonary effort is normal.      Breath sounds: Normal breath sounds. No wheezing, rhonchi or rales.   Abdominal:      Palpations: Abdomen is soft.   Musculoskeletal:      Cervical back: Full passive range of motion without pain and normal range of motion.      Right lower leg: No edema.      Left lower leg: No edema.   Skin:     General: Skin is warm.   Neurological:      General: No focal deficit present.      Mental Status: He is alert and oriented to person, place, and time.   Psychiatric:         Attention and Perception: Attention normal.         Mood  and Affect: Mood and affect normal.         Behavior: Behavior is cooperative.           Assessment:   1. IgG Kappa smoldering multiple myeloma  2. Progressive vision loss of unclear etiology - stable   3. Low back pain secondary to disk herniation.  4. Borderline normocytic anemia-- likely anemia of chronic disease (not meeting criteria for end organ anemia for MM)  5. CKD - thought to be secondary to hypertension and diabetes mellitus rather than myeloma relate  6. Left hip bursitis        Plan:       We will continue with close observation for his smoldering myeloma as per NCCN guidelines    RTC in 4 months with CBC, CMP, and MM labs completed 1 week prior to appointment     Would plan to repeat a bone marrow biopsy if his lab started to worsen.    **If his myeloma shows signs of progression would consider RVD again followed by maintenance Revlimid.  Due to his intolerance of dexamethasone in the past, we will try to get him off of steroids as soon as possible.    Monika Ayala, SANDYP-C

## 2023-01-05 LAB
ALBUMIN 24H UR ELPH-MRATE: 160.2 MG/24 H
ALBUMIN/GLOB 24H UR ELPH: 1.36 {RATIO}
ALPHA1 GLOB 24H UR ELPH-MRATE: 11.2 MG/24 H
ALPHA2 GLOB 24H UR ELPH-MRATE: 19.7 MG/24 H
B-GLOBULIN 24H UR ELPH-MRATE: 75.9 MG/24 H
COLLECT DURATION TIME UR: 24 H
GAMMA GLOB 24H UR ELPH-MRATE: 11.2 MG/24 H
PROT 24H UR-MRATE: 281 MG/24 H
PROT PATTERN 24H UR ELPH-IMP: ABNORMAL
SPECIMEN VOL ?TM UR: 1650 ML

## 2023-03-07 DIAGNOSIS — Q34.9: ICD-10-CM

## 2023-03-07 DIAGNOSIS — M54.59 OTHER LOW BACK PAIN: Primary | ICD-10-CM

## 2023-03-09 ENCOUNTER — TELEPHONE (OUTPATIENT)
Dept: NEUROSURGERY | Facility: CLINIC | Age: 73
End: 2023-03-09
Payer: MEDICARE

## 2023-03-09 NOTE — TELEPHONE ENCOUNTER
"----- Message from Parul Watkins MA sent at 3/9/2023  9:02 AM CST -----  Regarding: REFERRAL PROCESS-thoracic/ lumbar  This patient is being referred to Dr. Caro for other low back pain, thoracic/lumbar. Reviewing MRI report,"T11-T12 level left lateral canal/foraminal 9.9mm cyst. this indents the left thecal sac but does not compress the cord". I am sending this for review because I am not sure if that cyst is alarming. Please review imaging and advise on scheduling. Thank you    "

## 2023-03-09 NOTE — TELEPHONE ENCOUNTER
Spoke with patient. Scheduled him with Dr. Caro for 5/4/23 at 3:45. I did put him on the wait list per his request. Patient has not had any recent testing since MRI in 2/2023. Denies seeing any other doctors for this besides referring. He did state that he wanted to get a second opinion so he was planning to call Dr. Estrella because he is also in network with his insurance. I advised him that if he does see Dr. Estrella first, to let us know so we can get those notes prior to apt. He verbalized understanding. Patient has not had any conservative treatment because Dr. Benton wanted him to see a neurosurgeon for evaluation before he did any PT.

## 2023-04-14 ENCOUNTER — PATIENT MESSAGE (OUTPATIENT)
Dept: RESEARCH | Facility: HOSPITAL | Age: 73
End: 2023-04-14

## 2023-05-15 ENCOUNTER — LAB VISIT (OUTPATIENT)
Dept: LAB | Facility: HOSPITAL | Age: 73
End: 2023-05-15
Attending: INTERNAL MEDICINE
Payer: MEDICARE

## 2023-05-15 DIAGNOSIS — C90.00 MULTIPLE MYELOMA, REMISSION STATUS UNSPECIFIED: ICD-10-CM

## 2023-05-15 LAB
ALBUMIN SERPL-MCNC: 3.8 G/DL (ref 3.4–4.8)
ALBUMIN/GLOB SERPL: 1.1 RATIO (ref 1.1–2)
ALP SERPL-CCNC: 60 UNIT/L (ref 40–150)
ALT SERPL-CCNC: 21 UNIT/L (ref 0–55)
AST SERPL-CCNC: 21 UNIT/L (ref 5–34)
BASOPHILS # BLD AUTO: 0.04 X10(3)/MCL
BASOPHILS NFR BLD AUTO: 0.7 %
BILIRUBIN DIRECT+TOT PNL SERPL-MCNC: 0.3 MG/DL
BUN SERPL-MCNC: 30.4 MG/DL (ref 8.4–25.7)
CALCIUM SERPL-MCNC: 10.2 MG/DL (ref 8.8–10)
CHLORIDE SERPL-SCNC: 110 MMOL/L (ref 98–107)
CO2 SERPL-SCNC: 22 MMOL/L (ref 23–31)
CREAT SERPL-MCNC: 1.2 MG/DL (ref 0.73–1.18)
EOSINOPHIL # BLD AUTO: 0.12 X10(3)/MCL (ref 0–0.9)
EOSINOPHIL NFR BLD AUTO: 2.2 %
ERYTHROCYTE [DISTWIDTH] IN BLOOD BY AUTOMATED COUNT: 14.1 % (ref 11.5–17)
GFR SERPLBLD CREATININE-BSD FMLA CKD-EPI: >60 MLS/MIN/1.73/M2
GLOBULIN SER-MCNC: 3.4 GM/DL (ref 2.4–3.5)
GLUCOSE SERPL-MCNC: 153 MG/DL (ref 82–115)
HCT VFR BLD AUTO: 38.1 % (ref 42–52)
HGB BLD-MCNC: 11.7 G/DL (ref 14–18)
IGA SERPL-MCNC: 83 MG/DL (ref 101–645)
IGG SERPL-MCNC: 1725 MG/DL (ref 540–1822)
IGM SERPL-MCNC: 25 MG/DL (ref 22–240)
IMM GRANULOCYTES # BLD AUTO: 0.01 X10(3)/MCL (ref 0–0.04)
IMM GRANULOCYTES NFR BLD AUTO: 0.2 %
LYMPHOCYTES # BLD AUTO: 1.45 X10(3)/MCL (ref 0.6–4.6)
LYMPHOCYTES NFR BLD AUTO: 26.8 %
MCH RBC QN AUTO: 29.3 PG (ref 27–31)
MCHC RBC AUTO-ENTMCNC: 30.7 G/DL (ref 33–36)
MCV RBC AUTO: 95.5 FL (ref 80–94)
MONOCYTES # BLD AUTO: 0.38 X10(3)/MCL (ref 0.1–1.3)
MONOCYTES NFR BLD AUTO: 7 %
NEUTROPHILS # BLD AUTO: 3.41 X10(3)/MCL (ref 2.1–9.2)
NEUTROPHILS NFR BLD AUTO: 63.1 %
PLATELET # BLD AUTO: 231 X10(3)/MCL (ref 130–400)
PMV BLD AUTO: 9.8 FL (ref 7.4–10.4)
POTASSIUM SERPL-SCNC: 4.6 MMOL/L (ref 3.5–5.1)
PROT SERPL-MCNC: 7.2 GM/DL (ref 5.8–7.6)
RBC # BLD AUTO: 3.99 X10(6)/MCL (ref 4.7–6.1)
SODIUM SERPL-SCNC: 142 MMOL/L (ref 136–145)
WBC # SPEC AUTO: 5.41 X10(3)/MCL (ref 4.5–11.5)

## 2023-05-15 PROCEDURE — 84165 PROTEIN E-PHORESIS SERUM: CPT

## 2023-05-15 PROCEDURE — 83521 IG LIGHT CHAINS FREE EACH: CPT

## 2023-05-15 PROCEDURE — 86334 IMMUNOFIX E-PHORESIS SERUM: CPT

## 2023-05-15 PROCEDURE — 36415 COLL VENOUS BLD VENIPUNCTURE: CPT

## 2023-05-15 PROCEDURE — 82784 ASSAY IGA/IGD/IGG/IGM EACH: CPT

## 2023-05-15 PROCEDURE — 85025 COMPLETE CBC W/AUTO DIFF WBC: CPT

## 2023-05-15 PROCEDURE — 80053 COMPREHEN METABOLIC PANEL: CPT

## 2023-05-16 LAB
ALBUMIN % SPEP (OHS): 44.8
ALBUMIN SERPL-MCNC: 3.2 G/DL (ref 3.4–4.8)
ALBUMIN/GLOB SERPL: 0.8 RATIO (ref 1.1–2)
ALPHA 1 GLOB (OHS): 0.27 GM/DL
ALPHA 1 GLOB% (OHS): 3.69
ALPHA 2 GLOB % (OHS): 14.11
ALPHA 2 GLOB (OHS): 1.02 GM/DL
BETA GLOB (OHS): 2.2 GM/DL
BETA GLOB% (OHS): 30.6
GAMMA GLOBULIN % (OHS): 6.8
GAMMA GLOBULIN (OHS): 0.49 GM/DL
GLOBULIN SER-MCNC: 4 GM/DL (ref 2.4–3.5)
KAPPA LC FREE SER-MCNC: 13.9 MG/DL (ref 0.33–1.94)
KAPPA LC FREE/LAMBDA FREE SER: 13 {RATIO} (ref 0.26–1.65)
LAMBDA LC FREE SERPL-MCNC: 1.07 MG/DL (ref 0.57–2.63)
M SPIKE % (OHS): 14.72
M SPIKE (OHS): 1.06 GM/DL
PATH REV: NORMAL
PROT SERPL-MCNC: 7.2 GM/DL (ref 5.8–7.6)

## 2023-05-30 ENCOUNTER — OFFICE VISIT (OUTPATIENT)
Dept: HEMATOLOGY/ONCOLOGY | Facility: CLINIC | Age: 73
End: 2023-05-30
Payer: MEDICARE

## 2023-05-30 VITALS
BODY MASS INDEX: 42.56 KG/M2 | OXYGEN SATURATION: 92 % | SYSTOLIC BLOOD PRESSURE: 121 MMHG | HEART RATE: 62 BPM | WEIGHT: 271.19 LBS | DIASTOLIC BLOOD PRESSURE: 71 MMHG | TEMPERATURE: 98 F | HEIGHT: 67 IN

## 2023-05-30 DIAGNOSIS — C90.00 MULTIPLE MYELOMA, REMISSION STATUS UNSPECIFIED: Primary | ICD-10-CM

## 2023-05-30 DIAGNOSIS — E83.52 HYPERCALCEMIA: ICD-10-CM

## 2023-05-30 DIAGNOSIS — N28.9 FUNCTION KIDNEY DECREASED: ICD-10-CM

## 2023-05-30 PROCEDURE — 99214 PR OFFICE/OUTPT VISIT, EST, LEVL IV, 30-39 MIN: ICD-10-PCS | Mod: S$GLB,,, | Performed by: NURSE PRACTITIONER

## 2023-05-30 PROCEDURE — 99999 PR PBB SHADOW E&M-EST. PATIENT-LVL V: CPT | Mod: PBBFAC,,, | Performed by: NURSE PRACTITIONER

## 2023-05-30 PROCEDURE — 4010F ACE/ARB THERAPY RXD/TAKEN: CPT | Mod: CPTII,S$GLB,, | Performed by: NURSE PRACTITIONER

## 2023-05-30 PROCEDURE — 1101F PT FALLS ASSESS-DOCD LE1/YR: CPT | Mod: CPTII,S$GLB,, | Performed by: NURSE PRACTITIONER

## 2023-05-30 PROCEDURE — 4010F PR ACE/ARB THEARPY RXD/TAKEN: ICD-10-PCS | Mod: CPTII,S$GLB,, | Performed by: NURSE PRACTITIONER

## 2023-05-30 PROCEDURE — 3288F FALL RISK ASSESSMENT DOCD: CPT | Mod: CPTII,S$GLB,, | Performed by: NURSE PRACTITIONER

## 2023-05-30 PROCEDURE — 1159F MED LIST DOCD IN RCRD: CPT | Mod: CPTII,S$GLB,, | Performed by: NURSE PRACTITIONER

## 2023-05-30 PROCEDURE — 3074F PR MOST RECENT SYSTOLIC BLOOD PRESSURE < 130 MM HG: ICD-10-PCS | Mod: CPTII,S$GLB,, | Performed by: NURSE PRACTITIONER

## 2023-05-30 PROCEDURE — 1160F RVW MEDS BY RX/DR IN RCRD: CPT | Mod: CPTII,S$GLB,, | Performed by: NURSE PRACTITIONER

## 2023-05-30 PROCEDURE — 3078F PR MOST RECENT DIASTOLIC BLOOD PRESSURE < 80 MM HG: ICD-10-PCS | Mod: CPTII,S$GLB,, | Performed by: NURSE PRACTITIONER

## 2023-05-30 PROCEDURE — 1159F PR MEDICATION LIST DOCUMENTED IN MEDICAL RECORD: ICD-10-PCS | Mod: CPTII,S$GLB,, | Performed by: NURSE PRACTITIONER

## 2023-05-30 PROCEDURE — 3078F DIAST BP <80 MM HG: CPT | Mod: CPTII,S$GLB,, | Performed by: NURSE PRACTITIONER

## 2023-05-30 PROCEDURE — 3008F PR BODY MASS INDEX (BMI) DOCUMENTED: ICD-10-PCS | Mod: CPTII,S$GLB,, | Performed by: NURSE PRACTITIONER

## 2023-05-30 PROCEDURE — 99999 PR PBB SHADOW E&M-EST. PATIENT-LVL V: ICD-10-PCS | Mod: PBBFAC,,, | Performed by: NURSE PRACTITIONER

## 2023-05-30 PROCEDURE — 1160F PR REVIEW ALL MEDS BY PRESCRIBER/CLIN PHARMACIST DOCUMENTED: ICD-10-PCS | Mod: CPTII,S$GLB,, | Performed by: NURSE PRACTITIONER

## 2023-05-30 PROCEDURE — 1125F AMNT PAIN NOTED PAIN PRSNT: CPT | Mod: CPTII,S$GLB,, | Performed by: NURSE PRACTITIONER

## 2023-05-30 PROCEDURE — 1101F PR PT FALLS ASSESS DOC 0-1 FALLS W/OUT INJ PAST YR: ICD-10-PCS | Mod: CPTII,S$GLB,, | Performed by: NURSE PRACTITIONER

## 2023-05-30 PROCEDURE — 99214 OFFICE O/P EST MOD 30 MIN: CPT | Mod: S$GLB,,, | Performed by: NURSE PRACTITIONER

## 2023-05-30 PROCEDURE — 3074F SYST BP LT 130 MM HG: CPT | Mod: CPTII,S$GLB,, | Performed by: NURSE PRACTITIONER

## 2023-05-30 PROCEDURE — 3008F BODY MASS INDEX DOCD: CPT | Mod: CPTII,S$GLB,, | Performed by: NURSE PRACTITIONER

## 2023-05-30 PROCEDURE — 3288F PR FALLS RISK ASSESSMENT DOCUMENTED: ICD-10-PCS | Mod: CPTII,S$GLB,, | Performed by: NURSE PRACTITIONER

## 2023-05-30 PROCEDURE — 1125F PR PAIN SEVERITY QUANTIFIED, PAIN PRESENT: ICD-10-PCS | Mod: CPTII,S$GLB,, | Performed by: NURSE PRACTITIONER

## 2023-05-30 RX ORDER — HYDROCODONE BITARTRATE AND ACETAMINOPHEN 10; 325 MG/1; MG/1
TABLET ORAL
COMMUNITY
Start: 2023-04-12

## 2023-05-30 RX ORDER — GABAPENTIN 300 MG/1
300 CAPSULE ORAL 3 TIMES DAILY
COMMUNITY
Start: 2023-05-28

## 2023-05-30 NOTE — PROGRESS NOTES
Subjective:       Patient ID: Fortunato Alcala is a 73 y.o. male.    Chief Complaint: OTHER (No concerns today.)        Diagnosis:  1. IgG Kappa Multiple Myeloma with 15% plasma cells on bone marrow biopsy.  FISH shows monosomy 13. Clinically consistent with smoldering myeloma.  Baseline M protein of 1.68 g/dL  2. 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 Yuen--he declined the option of plasma exchange.    3. Low back pain secondary to disk herniation.  4. Borderline normocytic anemia-- likely anemia of chronic disease (not meeting criteria for end organ anemia for MM)  5. CKD --thought to be secondary to hypertension and diabetes mellitus rather than myeloma relate    Current Treatment:   Observation for now    Treatment History:  Revlimid 15 mg by mouth daily ×21 days on a every 28 day cycle with weekly dexamethasone 20 mg by mouth weekly x3 Months 4/2016-07/28/16    HPI:  Patient began having blurry vision in 2011, underwent workup by multiple specialists, eventually saw Dr. Gamble early in [place], [place].  He also was seen by Neurology, lumbar puncture done showed presence of IgG monoclonal immunoglobulins.  Serum protein electrophoresis confirmed M spike of 1.0.  Further workup started in June of 2014 revealed an M spike of 1.68, elevated kappa/lambda ratio.  Bone marrow biopsy on 07/15/2014 showed 15% monoclonal kappa plasma cells, fish study showed monosomy 13 (standard risk).  MRI on 08/08/2014 and PET/CT scan on 08/08/2014 showed no evidence of disease.  He was then seen at [facility] in September and November of 2014, recommendation was for observation of smoldering myeloma.  He had a repeat bone marrow biopsy on 09/15/2015 that showed stable plasmacytosis representing 12-16%.  The patient eventually was started on Revlimid 15 mg by mouth daily for 21 out of every 28 days with 20 mg of dexamethasone weekly.  He was treated with this from April of 2016 through July of 2016.  He was then placed back on observation, a repeat bone marrow biopsy done on 06/11/2018 showed no evidence of progression with plasma cells estimated to represent 10-12% of the bone marrow.  He was continued on observation.      Interval History:   Patient presents to clinic for scheduled follow up appointment for smoldering myeloma, accompanied by his wife.  He is feeling good overall other than some recent back pain. He has had imaging of his back and has seen ortho, the pain is not related to his multiple myeloma.      Past Medical History:   Diagnosis Date    BPH (benign prostatic hyperplasia)     Coronary artery disease     GERD (gastroesophageal reflux disease)     Hypertension     Kidney stones     Mixed hyperlipidemia     Multiple myeloma     Obstructive sleep apnea     Type 2 diabetes mellitus without complications       Past Surgical History:   Procedure Laterality Date    ABDOMINAL AORTIC ANEURYSM REPAIR      COLONOSCOPY  06/27/2017    KNEE SURGERY      LITHOTRIPSY      SHOULDER SURGERY Left      Social History     Social History Narrative    Not on file      Family History   Problem Relation Age of Onset    Anuerysm Mother     Heart disease Father     Anuerysm Father     Heart attack Father     Hypertension Sister       Review of patient's allergies indicates:   Allergen Reactions    Ketorolac      Other reaction(s): SEVERLY NAUSEATED, SOB    Nitrofurantoin monohyd/m-cryst      Other reaction(s): SEVERLY NAUSEATED    Sulfamethoxazole-trimethoprim Nausea Only     Other reaction(s): SEVERLY NAUSEATED, unknown    Nitrofurantoin Nausea Only      Review of Systems   Constitutional:  Positive for fatigue. Negative for appetite change, chills, diaphoresis, fever and unexpected weight change.   HENT:  Negative for nasal congestion, facial swelling, mouth sores, nosebleeds and sore throat.    Eyes:  Negative for pain and itching.   Respiratory:  Negative for cough, chest tightness and shortness of breath.     Cardiovascular:  Negative for chest pain, palpitations and leg swelling.   Gastrointestinal:  Negative for abdominal distention, abdominal pain, blood in stool, change in bowel habit, nausea, vomiting and change in bowel habit.   Genitourinary:  Negative for dysuria, frequency, hematuria and urgency.   Musculoskeletal:  Positive for back pain. Negative for arthralgias, joint swelling and myalgias.        Left hip pain from bursitis   Integumentary:  Negative for rash and wound.   Neurological:  Negative for dizziness, weakness, light-headedness and headaches.   Hematological:  Negative for adenopathy. Does not bruise/bleed easily.   Psychiatric/Behavioral:  Negative for agitation and behavioral problems.        Objective:      Physical Exam  Vitals reviewed.   Constitutional:       General: He is awake.      Appearance: Normal appearance.   HENT:      Head: Normocephalic and atraumatic.      Right Ear: Hearing normal.      Left Ear: Hearing normal.      Nose: Nose normal.   Eyes:      General: Lids are normal. Vision grossly intact.      Extraocular Movements: Extraocular movements intact.      Conjunctiva/sclera: Conjunctivae normal.   Cardiovascular:      Rate and Rhythm: Normal rate and regular rhythm.      Pulses: Normal pulses.      Heart sounds: Normal heart sounds.   Pulmonary:      Effort: Pulmonary effort is normal.      Breath sounds: Normal breath sounds. No wheezing, rhonchi or rales.   Abdominal:      Palpations: Abdomen is soft.   Musculoskeletal:      Cervical back: Full passive range of motion without pain and normal range of motion.      Right lower leg: No edema.      Left lower leg: No edema.   Skin:     General: Skin is warm.   Neurological:      General: No focal deficit present.      Mental Status: He is alert and oriented to person, place, and time.   Psychiatric:         Attention and Perception: Attention normal.         Mood and Affect: Mood and affect normal.         Behavior: Behavior  is cooperative.           Assessment:   1. IgG Kappa smoldering multiple myeloma  2. Progressive vision loss of unclear etiology - stable   3. Low back pain secondary to disk herniation.  4. Borderline normocytic anemia-- likely anemia of chronic disease (not meeting criteria for end organ anemia for MM)  5. CKD - thought to be secondary to hypertension and diabetes mellitus rather than myeloma relate  6. Left hip bursitis        Plan:     He had had some worsening of his labs, anemia and kidney function has worsened, calcium is high, m spike has increased. Recommended bone marrow biopsy, he is agreeable.      RTC in 6 weeks for bone marrow biopsy results.        **If his myeloma shows signs of progression would consider RVD again followed by maintenance Revlimid.  Due to his intolerance of dexamethasone in the past, we will try to get him off of steroids as soon as possible.    Monika Ayala, MONICA-C

## 2023-06-06 NOTE — DISCHARGE INSTRUCTIONS
-No driving for 24 hours and while taking narcotic pain medication.    -May remove band-aid tomorrow and shower.    - Report to ER with any bleeding, heavy bruising at site, or SEVERE/SUDDEN unrelieved abdominal pain.    - Biopsy results will be sent to your oncologist/referring physician.    - Call with any questions or concerns.    -May take over the counter meds or use ice packs for pain/discomfort.

## 2023-06-07 ENCOUNTER — HOSPITAL ENCOUNTER (OUTPATIENT)
Facility: HOSPITAL | Age: 73
Discharge: HOME | End: 2023-06-07
Attending: PATHOLOGY | Admitting: PATHOLOGY
Payer: MEDICARE

## 2023-06-07 DIAGNOSIS — N28.9 FUNCTION KIDNEY DECREASED: ICD-10-CM

## 2023-06-07 DIAGNOSIS — E83.52 HYPERCALCEMIA: ICD-10-CM

## 2023-06-07 DIAGNOSIS — C90.00 MULTIPLE MYELOMA, REMISSION STATUS UNSPECIFIED: ICD-10-CM

## 2023-06-07 LAB
BASOPHILS # BLD AUTO: 0.04 X10(3)/MCL
BASOPHILS NFR BLD AUTO: 0.8 %
EOSINOPHIL # BLD AUTO: 0.08 X10(3)/MCL (ref 0–0.9)
EOSINOPHIL NFR BLD AUTO: 1.6 %
ERYTHROCYTE [DISTWIDTH] IN BLOOD BY AUTOMATED COUNT: 14.6 % (ref 11.5–17)
HCT VFR BLD AUTO: 37.4 % (ref 42–52)
HGB BLD-MCNC: 12.1 G/DL (ref 14–18)
IMM GRANULOCYTES # BLD AUTO: 0.02 X10(3)/MCL (ref 0–0.04)
IMM GRANULOCYTES NFR BLD AUTO: 0.4 %
LYMPHOCYTES # BLD AUTO: 0.9 X10(3)/MCL (ref 0.6–4.6)
LYMPHOCYTES NFR BLD AUTO: 17.9 %
MCH RBC QN AUTO: 29.8 PG (ref 27–31)
MCHC RBC AUTO-ENTMCNC: 32.4 G/DL (ref 33–36)
MCV RBC AUTO: 92.1 FL (ref 80–94)
MONOCYTES # BLD AUTO: 0.34 X10(3)/MCL (ref 0.1–1.3)
MONOCYTES NFR BLD AUTO: 6.7 %
NEUTROPHILS # BLD AUTO: 3.66 X10(3)/MCL (ref 2.1–9.2)
NEUTROPHILS NFR BLD AUTO: 72.6 %
NRBC BLD AUTO-RTO: 0.4 %
PLATELET # BLD AUTO: 201 X10(3)/MCL (ref 130–400)
PMV BLD AUTO: 9.9 FL (ref 7.4–10.4)
POCT GLUCOSE: 86 MG/DL (ref 70–110)
RBC # BLD AUTO: 4.06 X10(6)/MCL (ref 4.7–6.1)
WBC # SPEC AUTO: 5.04 X10(3)/MCL (ref 4.5–11.5)

## 2023-06-07 PROCEDURE — 88185 FLOWCYTOMETRY/TC ADD-ON: CPT | Mod: 59

## 2023-06-07 PROCEDURE — 63600175 PHARM REV CODE 636 W HCPCS: Performed by: PATHOLOGY

## 2023-06-07 PROCEDURE — 88305 TISSUE EXAM BY PATHOLOGIST: CPT | Mod: 59 | Performed by: NURSE PRACTITIONER

## 2023-06-07 PROCEDURE — 88274 CYTOGENETICS 25-99: CPT

## 2023-06-07 PROCEDURE — 88188 FLOWCYTOMETRY/READ 9-15: CPT

## 2023-06-07 PROCEDURE — 88182 CELL MARKER STUDY: CPT

## 2023-06-07 PROCEDURE — 88184 FLOWCYTOMETRY/ TC 1 MARKER: CPT

## 2023-06-07 PROCEDURE — 88187 FLOWCYTOMETRY/READ 2-8: CPT

## 2023-06-07 PROCEDURE — 88184 FLOWCYTOMETRY/ TC 1 MARKER: CPT | Mod: 59

## 2023-06-07 PROCEDURE — 38222 DX BONE MARROW BX & ASPIR: CPT | Performed by: PATHOLOGY

## 2023-06-07 PROCEDURE — 88311 DECALCIFY TISSUE: CPT

## 2023-06-07 PROCEDURE — 85025 COMPLETE CBC W/AUTO DIFF WBC: CPT | Performed by: PATHOLOGY

## 2023-06-07 PROCEDURE — 88271 CYTOGENETICS DNA PROBE: CPT

## 2023-06-07 PROCEDURE — 25000003 PHARM REV CODE 250: Performed by: PATHOLOGY

## 2023-06-07 PROCEDURE — 88364 INSITU HYBRIDIZATION (FISH): CPT

## 2023-06-07 PROCEDURE — 88342 IMHCHEM/IMCYTCHM 1ST ANTB: CPT | Mod: 59

## 2023-06-07 PROCEDURE — 88291 CYTO/MOLECULAR REPORT: CPT

## 2023-06-07 PROCEDURE — 38221 DX BONE MARROW BIOPSIES: CPT | Performed by: PATHOLOGY

## 2023-06-07 PROCEDURE — 88365 INSITU HYBRIDIZATION (FISH): CPT

## 2023-06-07 PROCEDURE — 88313 SPECIAL STAINS GROUP 2: CPT | Mod: 59

## 2023-06-07 PROCEDURE — 38220 DX BONE MARROW ASPIRATIONS: CPT | Performed by: PATHOLOGY

## 2023-06-07 PROCEDURE — 99152 MOD SED SAME PHYS/QHP 5/>YRS: CPT | Performed by: PATHOLOGY

## 2023-06-07 RX ORDER — FLUMAZENIL 0.1 MG/ML
0.2 INJECTION INTRAVENOUS
Status: DISCONTINUED | OUTPATIENT
Start: 2023-06-07 | End: 2023-06-07 | Stop reason: HOSPADM

## 2023-06-07 RX ORDER — SODIUM CHLORIDE 9 MG/ML
INJECTION, SOLUTION INTRAVENOUS CONTINUOUS
Status: DISCONTINUED | OUTPATIENT
Start: 2023-06-07 | End: 2023-06-07 | Stop reason: HOSPADM

## 2023-06-07 RX ORDER — MIDAZOLAM HYDROCHLORIDE 1 MG/ML
1 INJECTION INTRAMUSCULAR; INTRAVENOUS
Status: DISCONTINUED | OUTPATIENT
Start: 2023-06-07 | End: 2023-06-07 | Stop reason: HOSPADM

## 2023-06-07 RX ORDER — NEBIVOLOL 5 MG/1
1 TABLET ORAL NIGHTLY
COMMUNITY

## 2023-06-07 RX ADMIN — SODIUM CHLORIDE: 9 INJECTION, SOLUTION INTRAVENOUS at 10:06

## 2023-06-07 RX ADMIN — MIDAZOLAM 2 MG: 1 INJECTION INTRAMUSCULAR; INTRAVENOUS at 10:06

## 2023-06-07 RX ADMIN — MIDAZOLAM 1 MG: 1 INJECTION INTRAMUSCULAR; INTRAVENOUS at 10:06

## 2023-06-07 NOTE — PROCEDURES
"Fortunato Alcala is a 73 y.o. male patient.    Temp: 98 °F (36.7 °C) (06/07/23 0755)  Pulse: (!) 57 (06/07/23 1028)  Resp: 19 (06/07/23 1023)  BP: 133/73 (06/07/23 1027)  SpO2: 96 % (06/07/23 1028)  Weight: 120.8 kg (266 lb 5.1 oz) (06/07/23 0755)  Height: 5' 7" (170.2 cm) (06/07/23 0755)  Mallampati Scale: Class II  ASA Classification: Class 2    Bone marrow    Date/Time: 6/7/2023 10:33 AM  Performed by: Sawyer Pillai MD  Authorized by: Sawyer Pillai MD     Consent Done?: Yes (Verbal) and Yes (Written)   Immediately prior to procedure a time out was called to verify the correct patient, procedure, equipment, support staff and site/side marked as required.   Patient was prepped and draped in the usual sterile fashion.      Assistants?: Yes    I was present for the entire procedure.    Position: left lateral  Anesthesia: local infiltration  Local anesthetic: lidocaine 1% without epinephrine  Aspiration?: Yes   Biopsy?: Yes    Specimen source: right posterior iliac crest  Number of Specimens: 2  Estimated blood loss (cc):2  Patient tolerated the procedure well with no immediate complications.    Patient sedated with 6mg Versed IV, administered in titrated doses.    6/7/2023    "

## 2023-06-07 NOTE — DISCHARGE SUMMARY
[facility] - Periop Services  Discharge Note  Short Stay    Procedure(s) (LRB):  Biopsy-bone marrow (N/A)      OUTCOME: Patient tolerated treatment/procedure well without complication and is now ready for discharge.    DISPOSITION: Home or Self Care    FINAL DIAGNOSIS:  <principal problem not specified>    FOLLOWUP: In clinic    DISCHARGE INSTRUCTIONS:  No discharge procedures on file.      Clinical Reference Documents Added to Patient Instructions         Document    BONE MARROW ASPIRATION OR BIOPSY (ENGLISH)            TIME SPENT ON DISCHARGE: 20 minutes

## 2023-06-09 VITALS
SYSTOLIC BLOOD PRESSURE: 155 MMHG | HEART RATE: 47 BPM | RESPIRATION RATE: 19 BRPM | TEMPERATURE: 98 F | HEIGHT: 67 IN | BODY MASS INDEX: 41.8 KG/M2 | DIASTOLIC BLOOD PRESSURE: 73 MMHG | OXYGEN SATURATION: 97 % | WEIGHT: 266.31 LBS

## 2023-06-14 ENCOUNTER — ANESTHESIA EVENT (OUTPATIENT)
Dept: SURGERY | Facility: HOSPITAL | Age: 73
End: 2023-06-14
Payer: MEDICARE

## 2023-06-14 RX ORDER — SODIUM CHLORIDE, SODIUM LACTATE, POTASSIUM CHLORIDE, CALCIUM CHLORIDE 600; 310; 30; 20 MG/100ML; MG/100ML; MG/100ML; MG/100ML
INJECTION, SOLUTION INTRAVENOUS CONTINUOUS
Status: CANCELLED | OUTPATIENT
Start: 2023-06-14

## 2023-06-14 NOTE — ANESTHESIA PREPROCEDURE EVALUATION
06/14/2023  Fortunato Alcala is a 73 y.o., male.      Pre-op Assessment    I have reviewed the Patient Summary Reports.     I have reviewed the Nursing Notes. I have reviewed the NPO Status.   I have reviewed the Medications.     Review of Systems  Anesthesia Hx:  Denies Family Hx of Anesthesia complications.   Denies Personal Hx of Anesthesia complications.   Social:  Former Smoker, Alcohol Use    Cardiovascular:   Hypertension CAD asymptomatic     Pulmonary:   COPD Sleep Apnea    Renal/:   Chronic Renal Disease, CKD    Hepatic/GI:   GERD, well controlled    Musculoskeletal:  Musculoskeletal Normal    Neurological:  Neurology Normal    Endocrine:   Diabetes    Dermatological:  Skin Normal    Psych:  Psychiatric Normal           Physical Exam  General: Cooperative, Alert and Oriented    Airway:  Mallampati: II   Mouth Opening: Normal  TM Distance: Normal  Tongue: Normal  Neck ROM: Normal ROM    Dental:  Intact        Anesthesia Plan  Type of Anesthesia, risks & benefits discussed:    Anesthesia Type: MAC  Intra-op Monitoring Plan: Standard ASA Monitors  Post Op Pain Control Plan:   (medical reason for not using multimodal pain management)  Induction:  IV  Informed Consent: Informed consent signed with the Patient and all parties understand the risks and agree with anesthesia plan.  All questions answered. Patient consented to blood products? Yes  ASA Score: 3    Ready For Surgery From Anesthesia Perspective.     .

## 2023-06-15 LAB — PSYCHE PATHOLOGY RESULT: NORMAL

## 2023-06-16 ENCOUNTER — HOSPITAL ENCOUNTER (OUTPATIENT)
Facility: HOSPITAL | Age: 73
Discharge: HOME | End: 2023-06-16
Attending: ANESTHESIOLOGY | Admitting: ANESTHESIOLOGY
Payer: MEDICARE

## 2023-06-16 ENCOUNTER — ANESTHESIA (OUTPATIENT)
Dept: SURGERY | Facility: HOSPITAL | Age: 73
End: 2023-06-16
Payer: MEDICARE

## 2023-06-16 VITALS
SYSTOLIC BLOOD PRESSURE: 179 MMHG | OXYGEN SATURATION: 97 % | HEIGHT: 67 IN | RESPIRATION RATE: 18 BRPM | WEIGHT: 271 LBS | DIASTOLIC BLOOD PRESSURE: 93 MMHG | HEART RATE: 52 BPM | BODY MASS INDEX: 42.53 KG/M2 | TEMPERATURE: 98 F

## 2023-06-16 DIAGNOSIS — M54.16 LUMBAR RADICULITIS: ICD-10-CM

## 2023-06-16 LAB — POCT GLUCOSE: 91 MG/DL (ref 70–110)

## 2023-06-16 PROCEDURE — D9220A PRA ANESTHESIA: Mod: ,,, | Performed by: NURSE ANESTHETIST, CERTIFIED REGISTERED

## 2023-06-16 PROCEDURE — 63600175 PHARM REV CODE 636 W HCPCS: Performed by: ANESTHESIOLOGY

## 2023-06-16 PROCEDURE — 37000008 HC ANESTHESIA 1ST 15 MINUTES: Performed by: ANESTHESIOLOGY

## 2023-06-16 PROCEDURE — 37000009 HC ANESTHESIA EA ADD 15 MINS: Performed by: ANESTHESIOLOGY

## 2023-06-16 PROCEDURE — 64484 NJX AA&/STRD TFRM EPI L/S EA: CPT | Mod: RT | Performed by: ANESTHESIOLOGY

## 2023-06-16 PROCEDURE — 64483 NJX AA&/STRD TFRM EPI L/S 1: CPT | Mod: RT | Performed by: ANESTHESIOLOGY

## 2023-06-16 PROCEDURE — D9220A PRA ANESTHESIA: ICD-10-PCS | Mod: ,,, | Performed by: NURSE ANESTHETIST, CERTIFIED REGISTERED

## 2023-06-16 PROCEDURE — 63600175 PHARM REV CODE 636 W HCPCS: Performed by: NURSE ANESTHETIST, CERTIFIED REGISTERED

## 2023-06-16 PROCEDURE — 25500020 PHARM REV CODE 255: Performed by: ANESTHESIOLOGY

## 2023-06-16 PROCEDURE — 63600175 PHARM REV CODE 636 W HCPCS

## 2023-06-16 RX ORDER — DEXAMETHASONE SODIUM PHOSPHATE 10 MG/ML
INJECTION INTRAMUSCULAR; INTRAVENOUS
Status: DISCONTINUED | OUTPATIENT
Start: 2023-06-16 | End: 2023-06-16 | Stop reason: HOSPADM

## 2023-06-16 RX ORDER — FENTANYL CITRATE 50 UG/ML
INJECTION, SOLUTION INTRAMUSCULAR; INTRAVENOUS
Status: DISCONTINUED | OUTPATIENT
Start: 2023-06-16 | End: 2023-06-16

## 2023-06-16 RX ORDER — MIDAZOLAM HYDROCHLORIDE 1 MG/ML
INJECTION INTRAMUSCULAR; INTRAVENOUS
Status: DISCONTINUED | OUTPATIENT
Start: 2023-06-16 | End: 2023-06-16

## 2023-06-16 RX ORDER — SODIUM CHLORIDE, SODIUM LACTATE, POTASSIUM CHLORIDE, CALCIUM CHLORIDE 600; 310; 30; 20 MG/100ML; MG/100ML; MG/100ML; MG/100ML
INJECTION, SOLUTION INTRAVENOUS CONTINUOUS
Status: DISCONTINUED | OUTPATIENT
Start: 2023-06-16 | End: 2023-06-16 | Stop reason: HOSPADM

## 2023-06-16 RX ADMIN — FENTANYL CITRATE 100 MCG: 50 INJECTION, SOLUTION INTRAMUSCULAR; INTRAVENOUS at 10:06

## 2023-06-16 RX ADMIN — MIDAZOLAM HYDROCHLORIDE 2 MG: 1 INJECTION INTRAMUSCULAR; INTRAVENOUS at 10:06

## 2023-06-16 RX ADMIN — SODIUM CHLORIDE, POTASSIUM CHLORIDE, SODIUM LACTATE AND CALCIUM CHLORIDE: 600; 310; 30; 20 INJECTION, SOLUTION INTRAVENOUS at 09:06

## 2023-06-16 NOTE — ANESTHESIA POSTPROCEDURE EVALUATION
Anesthesia Post Evaluation    Patient: Fortunato Alcala    Procedure(s) Performed: Procedure(s) (LRB):  INJECTION, STEROID, EPIDURAL, TRANSFORAMINAL APPROACH (Right L4 & L5 TFESI) (Right)    Final Anesthesia Type: MAC      Patient participation: Yes- Able to Participate  Level of consciousness: awake and alert and oriented  Post-procedure vital signs: reviewed and stable  Pain management: adequate  Airway patency: patent    PONV status at discharge: No PONV  Anesthetic complications: no      Cardiovascular status: blood pressure returned to baseline  Respiratory status: unassisted, spontaneous ventilation and room air  Hydration status: euvolemic  Follow-up not needed.          Vitals Value Taken Time     06/16/23 1024     06/16/23 1024     06/16/23 1024     06/16/23 1024     06/16/23 1024         No case tracking events are documented in the log.      Pain/Miguel Score: No data recorded

## 2023-06-16 NOTE — H&P
Patient presenting for Procedure(s) (LRB):  INJECTION, STEROID, EPIDURAL, TRANSFORAMINAL APPROACH (Right L4 & L5 TFESI) (Right)     Patient on Anti-coagulation No    No health changes since previous encounter    Past Medical History:   Diagnosis Date    BPH (benign prostatic hyperplasia)     Coronary artery disease     GERD (gastroesophageal reflux disease)     Hypertension     Kidney stones     Mixed hyperlipidemia     Multiple myeloma     Obstructive sleep apnea     Type 2 diabetes mellitus without complications      Past Surgical History:   Procedure Laterality Date    ABDOMINAL AORTIC ANEURYSM REPAIR      BONE MARROW BIOPSY N/A 6/7/2023    Procedure: Biopsy-bone marrow;  Surgeon: Sawyer Pillai MD;  Location: [facility];  Service: General;  Laterality: N/A;    COLONOSCOPY  06/27/2017    KNEE SURGERY      LITHOTRIPSY      SHOULDER SURGERY Left      Review of patient's allergies indicates:   Allergen Reactions    Ketorolac      Other reaction(s): SEVERLY NAUSEATED, SOB    Nitrofurantoin monohyd/m-cryst      Other reaction(s): SEVERLY NAUSEATED    Sulfamethoxazole-trimethoprim Nausea Only     Other reaction(s): SEVERLY NAUSEATED, unknown    Nitrofurantoin Nausea Only        No current facility-administered medications on file prior to encounter.     Current Outpatient Medications on File Prior to Encounter   Medication Sig Dispense Refill    amLODIPine (NORVASC) 5 MG tablet 5 mg every morning.      amlodipine-olmesartan (BERNARD) 10-40 mg per tablet Take 1 tablet by mouth once daily. NOT TAKING      baclofen (LIORESAL) 10 MG tablet Take 10 mg by mouth 3 (three) times daily. NOT TAKING      doxazosin (CARDURA) 2 MG tablet Take 2 mg by mouth nightly.      esomeprazole (NEXIUM) 40 MG capsule Take 40 mg by mouth before breakfast.      fenofibrate 160 MG Tab Take 160 mg by mouth every evening.      finasteride (PROSCAR) 5 mg tablet Take 5 mg by mouth nightly.      furosemide (LASIX) 40 MG tablet Take 40 mg by mouth every other  "day. NOT TAKING      gabapentin (NEURONTIN) 300 MG capsule Take 300 mg by mouth 3 (three) times daily.      GLYXAMBI 25-5 mg Tab every morning.      guanfacine (TENEX) 1 MG Tab Take 1 mg by mouth every evening. NOT TAKING      HYDROcodone-acetaminophen (NORCO)  mg per tablet Norco 10 mg-325 mg tablet   Take 1 tablet 3 times a day by oral route for 28 days.      lovastatin (MEVACOR) 40 MG tablet Take 40 mg by mouth every evening.      metformin (GLUCOPHAGE) 1000 MG tablet Take 1,000 mg by mouth 2 (two) times daily with meals. NOT TAKING      mometasone (NASONEX) 50 mcg/actuation nasal spray 2 sprays by Nasal route once daily. NOT TAKING      nebivoloL (BYSTOLIC) 5 MG Tab Take 1 tablet by mouth nightly.      pantoprazole (PROTONIX) 40 MG tablet Take 40 mg by mouth every morning.      pioglitazone (ACTOS) 30 MG tablet Take 30 mg by mouth every Monday.      ranolazine (RANEXA) 500 MG Tb12 Take 500 mg by mouth 2 (two) times daily. NOT TAKING      SAXagliptin (ONGLYZA) 5 mg Tab tablet Take 5 mg by mouth once daily. NOT TAKING      SITagliptin (JANUVIA) 100 MG Tab Take 100 mg by mouth every morning.      tamsulosin (FLOMAX) 0.4 mg Cp24 Take 0.4 mg by mouth every morning.      timolol 0.25 % ophthalmic solution 1-2 drops 2 (two) times daily. NOT TAKING      tramadol (ULTRAM) 50 mg tablet Take 50 mg by mouth every 6 (six) hours as needed for Pain.      valsartan (DIOVAN) 160 MG tablet Take 160 mg by mouth nightly.      zolpidem 12.5 MG ORAL CR TBMP (AMBIEN CR) 12.5 MG CR tablet Take 12.5 mg by mouth nightly as needed for Insomnia. NOT TAKING          PMHx, PSHx, Allergies, Medications reviewed in epic    ROS negative except pain complaints in HPI    OBJECTIVE:    BP (!) 175/84   Pulse (!) 53   Temp 98.4 °F (36.9 °C) (Oral)   Ht 5' 7" (1.702 m)   Wt 122.9 kg (271 lb)   SpO2 97%   BMI 42.44 kg/m²     PHYSICAL EXAMINATION:    GENERAL: Well appearing, in no acute distress, alert and oriented x3.  PSYCH:  Mood and " affect appropriate.  SKIN: Skin color, texture, turgor normal, no rashes or lesions which will impact the procedure.  CV: RRR with palpation of the radial artery.  PULM: No evidence of respiratory difficulty, symmetric chest rise. Clear to auscultation.  NEURO: Cranial nerves grossly intact.    Plan:    Proceed with procedure as planned Procedure(s) (LRB):  INJECTION, STEROID, EPIDURAL, TRANSFORAMINAL APPROACH (Right L4 & L5 TFESI) (Right)    Roger Hauser MD  06/16/2023

## 2023-06-16 NOTE — DISCHARGE SUMMARY
[facility] - Periop Services  Discharge Note  Short Stay    Procedure(s) (LRB):  INJECTION, STEROID, EPIDURAL, TRANSFORAMINAL APPROACH (Right L4 & L5 TFESI) (Right)      OUTCOME: Patient tolerated treatment/procedure well without complication and is now ready for discharge.    DISPOSITION: Home or Self Care    FINAL DIAGNOSIS:  Lumbar radiculitis    FOLLOWUP: In clinic    DISCHARGE INSTRUCTIONS:  No discharge procedures on file.      Clinical Reference Documents Added to Patient Instructions         Document    EPIDURAL INJECTION (ENGLISH)            TIME SPENT ON DISCHARGE: 2 minutes

## 2023-07-14 ENCOUNTER — TELEPHONE (OUTPATIENT)
Dept: HEMATOLOGY/ONCOLOGY | Facility: CLINIC | Age: 73
End: 2023-07-14
Payer: MEDICARE

## 2023-07-14 ENCOUNTER — LAB VISIT (OUTPATIENT)
Dept: LAB | Facility: HOSPITAL | Age: 73
End: 2023-07-14
Attending: INTERNAL MEDICINE
Payer: MEDICARE

## 2023-07-14 DIAGNOSIS — C90.00 MULTIPLE MYELOMA, REMISSION STATUS UNSPECIFIED: ICD-10-CM

## 2023-07-14 DIAGNOSIS — C90.00 MULTIPLE MYELOMA, REMISSION STATUS UNSPECIFIED: Primary | ICD-10-CM

## 2023-07-14 LAB
ALBUMIN SERPL-MCNC: 3.8 G/DL (ref 3.4–4.8)
ALBUMIN/GLOB SERPL: 1.1 RATIO (ref 1.1–2)
ALP SERPL-CCNC: 47 UNIT/L (ref 40–150)
ALT SERPL-CCNC: 17 UNIT/L (ref 0–55)
AST SERPL-CCNC: 19 UNIT/L (ref 5–34)
BASOPHILS # BLD AUTO: 0.05 X10(3)/MCL
BASOPHILS NFR BLD AUTO: 0.9 %
BILIRUBIN DIRECT+TOT PNL SERPL-MCNC: 0.4 MG/DL
BUN SERPL-MCNC: 19.8 MG/DL (ref 8.4–25.7)
CALCIUM SERPL-MCNC: 10.1 MG/DL (ref 8.8–10)
CHLORIDE SERPL-SCNC: 112 MMOL/L (ref 98–107)
CO2 SERPL-SCNC: 24 MMOL/L (ref 23–31)
CREAT SERPL-MCNC: 1.05 MG/DL (ref 0.73–1.18)
EOSINOPHIL # BLD AUTO: 0.11 X10(3)/MCL (ref 0–0.9)
EOSINOPHIL NFR BLD AUTO: 2 %
ERYTHROCYTE [DISTWIDTH] IN BLOOD BY AUTOMATED COUNT: 13.7 % (ref 11.5–17)
FERRITIN SERPL-MCNC: 57.51 NG/ML (ref 21.81–274.66)
GFR SERPLBLD CREATININE-BSD FMLA CKD-EPI: >60 MLS/MIN/1.73/M2
GLOBULIN SER-MCNC: 3.4 GM/DL (ref 2.4–3.5)
GLUCOSE SERPL-MCNC: 84 MG/DL (ref 82–115)
HCT VFR BLD AUTO: 40.5 % (ref 42–52)
HGB BLD-MCNC: 12.7 G/DL (ref 14–18)
IMM GRANULOCYTES # BLD AUTO: 0.01 X10(3)/MCL (ref 0–0.04)
IMM GRANULOCYTES NFR BLD AUTO: 0.2 %
IRON SATN MFR SERPL: 21 % (ref 20–50)
IRON SERPL-MCNC: 65 UG/DL (ref 65–175)
LYMPHOCYTES # BLD AUTO: 1.55 X10(3)/MCL (ref 0.6–4.6)
LYMPHOCYTES NFR BLD AUTO: 28.7 %
MCH RBC QN AUTO: 29.8 PG (ref 27–31)
MCHC RBC AUTO-ENTMCNC: 31.4 G/DL (ref 33–36)
MCV RBC AUTO: 95.1 FL (ref 80–94)
MONOCYTES # BLD AUTO: 0.42 X10(3)/MCL (ref 0.1–1.3)
MONOCYTES NFR BLD AUTO: 7.8 %
NEUTROPHILS # BLD AUTO: 3.26 X10(3)/MCL (ref 2.1–9.2)
NEUTROPHILS NFR BLD AUTO: 60.4 %
PLATELET # BLD AUTO: 194 X10(3)/MCL (ref 130–400)
PMV BLD AUTO: 9.8 FL (ref 7.4–10.4)
POTASSIUM SERPL-SCNC: 4.8 MMOL/L (ref 3.5–5.1)
PROT SERPL-MCNC: 7.2 GM/DL (ref 5.8–7.6)
RBC # BLD AUTO: 4.26 X10(6)/MCL (ref 4.7–6.1)
SODIUM SERPL-SCNC: 144 MMOL/L (ref 136–145)
TIBC SERPL-MCNC: 246 UG/DL (ref 69–240)
TIBC SERPL-MCNC: 311 UG/DL (ref 250–450)
TRANSFERRIN SERPL-MCNC: 276 MG/DL (ref 163–344)
TRANSFERRIN SERPL-MCNC: 276 MG/DL (ref 163–344)
WBC # SPEC AUTO: 5.4 X10(3)/MCL (ref 4.5–11.5)

## 2023-07-14 PROCEDURE — 82728 ASSAY OF FERRITIN: CPT

## 2023-07-14 PROCEDURE — 84466 ASSAY OF TRANSFERRIN: CPT

## 2023-07-14 PROCEDURE — 80053 COMPREHEN METABOLIC PANEL: CPT

## 2023-07-14 PROCEDURE — 85025 COMPLETE CBC W/AUTO DIFF WBC: CPT

## 2023-07-14 PROCEDURE — 83550 IRON BINDING TEST: CPT

## 2023-07-14 PROCEDURE — 36415 COLL VENOUS BLD VENIPUNCTURE: CPT

## 2023-08-07 ENCOUNTER — OFFICE VISIT (OUTPATIENT)
Dept: HEMATOLOGY/ONCOLOGY | Facility: CLINIC | Age: 73
End: 2023-08-07
Payer: MEDICARE

## 2023-08-07 VITALS
OXYGEN SATURATION: 94 % | WEIGHT: 274.25 LBS | HEART RATE: 52 BPM | DIASTOLIC BLOOD PRESSURE: 76 MMHG | BODY MASS INDEX: 43.04 KG/M2 | TEMPERATURE: 98 F | SYSTOLIC BLOOD PRESSURE: 153 MMHG | RESPIRATION RATE: 18 BRPM | HEIGHT: 67 IN

## 2023-08-07 DIAGNOSIS — D47.2 SMOLDERING MULTIPLE MYELOMA (SMM): Primary | ICD-10-CM

## 2023-08-07 PROCEDURE — 3078F PR MOST RECENT DIASTOLIC BLOOD PRESSURE < 80 MM HG: ICD-10-PCS | Mod: CPTII,S$GLB,, | Performed by: INTERNAL MEDICINE

## 2023-08-07 PROCEDURE — 3288F FALL RISK ASSESSMENT DOCD: CPT | Mod: CPTII,S$GLB,, | Performed by: INTERNAL MEDICINE

## 2023-08-07 PROCEDURE — 1125F AMNT PAIN NOTED PAIN PRSNT: CPT | Mod: CPTII,S$GLB,, | Performed by: INTERNAL MEDICINE

## 2023-08-07 PROCEDURE — 1125F PR PAIN SEVERITY QUANTIFIED, PAIN PRESENT: ICD-10-PCS | Mod: CPTII,S$GLB,, | Performed by: INTERNAL MEDICINE

## 2023-08-07 PROCEDURE — 1160F RVW MEDS BY RX/DR IN RCRD: CPT | Mod: CPTII,S$GLB,, | Performed by: INTERNAL MEDICINE

## 2023-08-07 PROCEDURE — 3078F DIAST BP <80 MM HG: CPT | Mod: CPTII,S$GLB,, | Performed by: INTERNAL MEDICINE

## 2023-08-07 PROCEDURE — 4010F PR ACE/ARB THEARPY RXD/TAKEN: ICD-10-PCS | Mod: CPTII,S$GLB,, | Performed by: INTERNAL MEDICINE

## 2023-08-07 PROCEDURE — 1159F MED LIST DOCD IN RCRD: CPT | Mod: CPTII,S$GLB,, | Performed by: INTERNAL MEDICINE

## 2023-08-07 PROCEDURE — 1160F PR REVIEW ALL MEDS BY PRESCRIBER/CLIN PHARMACIST DOCUMENTED: ICD-10-PCS | Mod: CPTII,S$GLB,, | Performed by: INTERNAL MEDICINE

## 2023-08-07 PROCEDURE — 3077F PR MOST RECENT SYSTOLIC BLOOD PRESSURE >= 140 MM HG: ICD-10-PCS | Mod: CPTII,S$GLB,, | Performed by: INTERNAL MEDICINE

## 2023-08-07 PROCEDURE — 3008F PR BODY MASS INDEX (BMI) DOCUMENTED: ICD-10-PCS | Mod: CPTII,S$GLB,, | Performed by: INTERNAL MEDICINE

## 2023-08-07 PROCEDURE — 3288F PR FALLS RISK ASSESSMENT DOCUMENTED: ICD-10-PCS | Mod: CPTII,S$GLB,, | Performed by: INTERNAL MEDICINE

## 2023-08-07 PROCEDURE — 99214 OFFICE O/P EST MOD 30 MIN: CPT | Mod: S$GLB,,, | Performed by: INTERNAL MEDICINE

## 2023-08-07 PROCEDURE — 4010F ACE/ARB THERAPY RXD/TAKEN: CPT | Mod: CPTII,S$GLB,, | Performed by: INTERNAL MEDICINE

## 2023-08-07 PROCEDURE — 1159F PR MEDICATION LIST DOCUMENTED IN MEDICAL RECORD: ICD-10-PCS | Mod: CPTII,S$GLB,, | Performed by: INTERNAL MEDICINE

## 2023-08-07 PROCEDURE — 99999 PR PBB SHADOW E&M-EST. PATIENT-LVL V: ICD-10-PCS | Mod: PBBFAC,,, | Performed by: INTERNAL MEDICINE

## 2023-08-07 PROCEDURE — 99214 PR OFFICE/OUTPT VISIT, EST, LEVL IV, 30-39 MIN: ICD-10-PCS | Mod: S$GLB,,, | Performed by: INTERNAL MEDICINE

## 2023-08-07 PROCEDURE — 1101F PT FALLS ASSESS-DOCD LE1/YR: CPT | Mod: CPTII,S$GLB,, | Performed by: INTERNAL MEDICINE

## 2023-08-07 PROCEDURE — 3008F BODY MASS INDEX DOCD: CPT | Mod: CPTII,S$GLB,, | Performed by: INTERNAL MEDICINE

## 2023-08-07 PROCEDURE — 3077F SYST BP >= 140 MM HG: CPT | Mod: CPTII,S$GLB,, | Performed by: INTERNAL MEDICINE

## 2023-08-07 PROCEDURE — 1101F PR PT FALLS ASSESS DOC 0-1 FALLS W/OUT INJ PAST YR: ICD-10-PCS | Mod: CPTII,S$GLB,, | Performed by: INTERNAL MEDICINE

## 2023-08-07 PROCEDURE — 99999 PR PBB SHADOW E&M-EST. PATIENT-LVL V: CPT | Mod: PBBFAC,,, | Performed by: INTERNAL MEDICINE

## 2023-09-28 DIAGNOSIS — M54.16 LUMBAR RADICULOPATHY: ICD-10-CM

## 2023-09-28 DIAGNOSIS — M51.36 LUMBAR DEGENERATIVE DISC DISEASE: Primary | ICD-10-CM

## 2023-09-28 DIAGNOSIS — C90.00 MULTIPLE MYELOMA: ICD-10-CM

## 2024-01-01 NOTE — PROGRESS NOTES
Subjective:       Patient ID: Fortunato Alcala is a 73 y.o. male.    Chief Complaint: OTHER (No concerns today.)        Diagnosis:  IgG Kappa Multiple Myeloma with 15% plasma cells on bone marrow biopsy.  FISH shows monosomy 13. Clinically consistent with smoldering myeloma.  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 Yuen--he declined the option of plasma exchange.    Low back pain secondary to disk herniation.  Borderline normocytic anemia-- likely anemia of chronic disease (not meeting criteria for end organ anemia for MM)  CKD --thought to be secondary to hypertension and diabetes mellitus rather than myeloma relate    Current Treatment:   Observation for now    Treatment History:  Revlimid 15 mg by mouth daily ×21 days on a every 28 day cycle with weekly dexamethasone 20 mg by mouth weekly x3 Months 4/2016-07/28/16    HPI:  Patient began having blurry vision in 2011, underwent workup by multiple specialists, eventually saw Dr. Gamble early in [place], [place].  He also was seen by Neurology, lumbar puncture done showed presence of IgG monoclonal immunoglobulins.  Serum protein electrophoresis confirmed M spike of 1.0.  Further workup started in June of 2014 revealed an M spike of 1.68, elevated kappa/lambda ratio.  Bone marrow biopsy on 07/15/2014 showed 15% monoclonal kappa plasma cells, fish study showed monosomy 13 (standard risk).  MRI on 08/08/2014 and PET/CT scan on 08/08/2014 showed no evidence of disease.  He was then seen at [facility] in September and November of 2014, recommendation was for observation of smoldering myeloma.  He had a repeat bone marrow biopsy on 09/15/2015 that showed stable plasmacytosis representing 12-16%.  The patient eventually was started on Revlimid 15 mg by mouth daily for 21 out of every 28 days with 20 mg of dexamethasone weekly.  He was treated with this from April of 2016 through July of 2016. He was then  placed back on observation, a repeat bone marrow biopsy done on 06/11/2018 showed no evidence of progression with plasma cells estimated to represent 10-12% of the bone marrow.  He was continued on observation.  Bone marrow biopsy done on 06/07/2023 showed persistent/residual plasma cell myeloma, kappa light chain restricted involving 10-15% of the bone marrow, this is stable from previous.    Interval History:   Patient presents to clinic for scheduled follow up appointment for smoldering myeloma, accompanied by his wife.  He is happy with his bone marrow results.  Overall he is feeling well.  He and his wife just moved again.        Past Medical History:   Diagnosis Date    BPH (benign prostatic hyperplasia)     Coronary artery disease     GERD (gastroesophageal reflux disease)     Hypertension     Kidney stones     Mixed hyperlipidemia     Multiple myeloma     Obstructive sleep apnea     Type 2 diabetes mellitus without complications       Past Surgical History:   Procedure Laterality Date    ABDOMINAL AORTIC ANEURYSM REPAIR      BONE MARROW BIOPSY N/A 6/7/2023    Procedure: Biopsy-bone marrow;  Surgeon: Sawyer Pillai MD;  Location: [facility];  Service: General;  Laterality: N/A;    COLONOSCOPY  06/27/2017    KNEE SURGERY      LITHOTRIPSY      SHOULDER SURGERY Left     TRANSFORAMINAL EPIDURAL INJECTION OF STEROID Right 6/16/2023    Procedure: INJECTION, STEROID, EPIDURAL, TRANSFORAMINAL APPROACH (Right L4-5 & L5-S1 TFESI) with fluro;  Surgeon: Roger Hauser MD;  Location: [facility] OR;  Service: Pain Management;  Laterality: Right;     Social History     Social History Narrative    ** Merged History Encounter **           Family History   Problem Relation Age of Onset    Anuerysm Mother     Heart disease Father     Anuerysm Father     Heart attack Father     Hypertension Sister       Review of patient's allergies indicates:   Allergen Reactions    Ketorolac      Other reaction(s): SEVERLY NAUSEATED, SOB    Nitrofurantoin  monohyd/m-cryst      Other reaction(s): SEVERLY NAUSEATED    Sulfamethoxazole-trimethoprim Nausea Only     Other reaction(s): SEVERLY NAUSEATED, unknown    Nitrofurantoin Nausea Only      Review of Systems   Constitutional:  Positive for fatigue. Negative for appetite change, chills, diaphoresis, fever and unexpected weight change.   HENT:  Negative for nasal congestion, facial swelling, mouth sores, nosebleeds and sore throat.    Eyes:  Negative for pain and itching.   Respiratory:  Negative for cough, chest tightness and shortness of breath.    Cardiovascular:  Negative for chest pain, palpitations and leg swelling.   Gastrointestinal:  Negative for abdominal distention, abdominal pain, blood in stool, change in bowel habit, nausea, vomiting and change in bowel habit.   Genitourinary:  Negative for dysuria, frequency, hematuria and urgency.   Musculoskeletal:  Positive for back pain. Negative for arthralgias, joint swelling and myalgias.        Left hip pain from bursitis   Integumentary:  Negative for rash and wound.   Neurological:  Negative for dizziness, weakness, light-headedness and headaches.   Hematological:  Negative for adenopathy. Does not bruise/bleed easily.   Psychiatric/Behavioral:  Negative for agitation and behavioral problems.          Objective:      Physical Exam  Vitals reviewed.   Constitutional:       General: He is awake.      Appearance: Normal appearance.   HENT:      Head: Normocephalic and atraumatic.      Right Ear: Hearing normal.      Left Ear: Hearing normal.      Nose: Nose normal.   Eyes:      General: Lids are normal. Vision grossly intact.      Extraocular Movements: Extraocular movements intact.      Conjunctiva/sclera: Conjunctivae normal.   Cardiovascular:      Rate and Rhythm: Normal rate and regular rhythm.      Pulses: Normal pulses.      Heart sounds: Normal heart sounds.   Pulmonary:      Effort: Pulmonary effort is normal.      Breath sounds: Normal breath sounds. No  wheezing, rhonchi or rales.   Abdominal:      Palpations: Abdomen is soft.   Musculoskeletal:      Cervical back: Full passive range of motion without pain and normal range of motion.      Right lower leg: No edema.      Left lower leg: No edema.   Skin:     General: Skin is warm.   Neurological:      General: No focal deficit present.      Mental Status: He is alert and oriented to person, place, and time.   Psychiatric:         Attention and Perception: Attention normal.         Mood and Affect: Mood and affect normal.         Behavior: Behavior is cooperative.             Assessment:   IgG Kappa smoldering multiple myeloma  Progressive vision loss of unclear etiology - stable   Low back pain secondary to disk herniation.  Borderline normocytic anemia-- likely anemia of chronic disease (not meeting criteria for end organ anemia for MM)  CKD - thought to be secondary to hypertension and diabetes mellitus rather than myeloma relate  Left hip bursitis      Plan:       He had had some worsening of his labs, anemia and kidney function had worsened, calcium was high, m spike had increased.  This led to a bone marrow biopsy being done on 06/07/2023, this showed stable persistent 10-15% involvement by myeloma.    I think patient still meets criteria for smoldering myeloma, we will go back on observation.    RTC in 6 months with labs prior      **If his myeloma shows signs of progression would consider RVD again followed by maintenance Revlimid.  Due to his intolerance of dexamethasone in the past, we will try to get him off of steroids as soon as possible.    Hector Snow II, MD I, Rayne Turner LPN, acted solely as a scribe for and in the presence of, Dr. Hector Snow who performed the service.        01-Jan-2024

## 2024-02-28 ENCOUNTER — LAB VISIT (OUTPATIENT)
Dept: LAB | Facility: HOSPITAL | Age: 74
End: 2024-02-28
Attending: INTERNAL MEDICINE
Payer: MEDICARE

## 2024-02-28 DIAGNOSIS — D47.2 SMOLDERING MULTIPLE MYELOMA (SMM): ICD-10-CM

## 2024-02-28 LAB
ALBUMIN SERPL-MCNC: 3.7 G/DL (ref 3.4–4.8)
ALBUMIN/GLOB SERPL: 1.1 RATIO (ref 1.1–2)
ALP SERPL-CCNC: 52 UNIT/L (ref 40–150)
ALT SERPL-CCNC: 16 UNIT/L (ref 0–55)
AST SERPL-CCNC: 17 UNIT/L (ref 5–34)
B2 MICROGLOB SERPL-MCNC: 2.54 MG/L (ref 0.97–2.64)
BASOPHILS # BLD AUTO: 0.03 X10(3)/MCL
BASOPHILS NFR BLD AUTO: 0.5 %
BILIRUB SERPL-MCNC: 0.4 MG/DL
BUN SERPL-MCNC: 21.6 MG/DL (ref 8.4–25.7)
CALCIUM SERPL-MCNC: 10.4 MG/DL (ref 8.8–10)
CHLORIDE SERPL-SCNC: 108 MMOL/L (ref 98–107)
CO2 SERPL-SCNC: 26 MMOL/L (ref 23–31)
CREAT SERPL-MCNC: 0.98 MG/DL (ref 0.73–1.18)
EOSINOPHIL # BLD AUTO: 0.11 X10(3)/MCL (ref 0–0.9)
EOSINOPHIL NFR BLD AUTO: 1.9 %
ERYTHROCYTE [DISTWIDTH] IN BLOOD BY AUTOMATED COUNT: 13.5 % (ref 11.5–17)
GFR SERPLBLD CREATININE-BSD FMLA CKD-EPI: >60 MLS/MIN/1.73/M2
GLOBULIN SER-MCNC: 3.4 GM/DL (ref 2.4–3.5)
GLUCOSE SERPL-MCNC: 91 MG/DL (ref 82–115)
HCT VFR BLD AUTO: 38.7 % (ref 42–52)
HGB BLD-MCNC: 12.4 G/DL (ref 14–18)
IGA SERPL-MCNC: 68 MG/DL (ref 101–645)
IGG SERPL-MCNC: 1588 MG/DL (ref 540–1822)
IGM SERPL-MCNC: 26 MG/DL (ref 22–240)
IMM GRANULOCYTES # BLD AUTO: 0.01 X10(3)/MCL (ref 0–0.04)
IMM GRANULOCYTES NFR BLD AUTO: 0.2 %
LYMPHOCYTES # BLD AUTO: 1.57 X10(3)/MCL (ref 0.6–4.6)
LYMPHOCYTES NFR BLD AUTO: 27.2 %
MCH RBC QN AUTO: 29.6 PG (ref 27–31)
MCHC RBC AUTO-ENTMCNC: 32 G/DL (ref 33–36)
MCV RBC AUTO: 92.4 FL (ref 80–94)
MONOCYTES # BLD AUTO: 0.46 X10(3)/MCL (ref 0.1–1.3)
MONOCYTES NFR BLD AUTO: 8 %
NEUTROPHILS # BLD AUTO: 3.6 X10(3)/MCL (ref 2.1–9.2)
NEUTROPHILS NFR BLD AUTO: 62.2 %
PLATELET # BLD AUTO: 217 X10(3)/MCL (ref 130–400)
PMV BLD AUTO: 9.5 FL (ref 7.4–10.4)
POTASSIUM SERPL-SCNC: 5.2 MMOL/L (ref 3.5–5.1)
PROT SERPL-MCNC: 7.1 GM/DL (ref 5.8–7.6)
RBC # BLD AUTO: 4.19 X10(6)/MCL (ref 4.7–6.1)
SODIUM SERPL-SCNC: 143 MMOL/L (ref 136–145)
WBC # SPEC AUTO: 5.78 X10(3)/MCL (ref 4.5–11.5)

## 2024-02-28 PROCEDURE — 82232 ASSAY OF BETA-2 PROTEIN: CPT

## 2024-02-28 PROCEDURE — 84165 PROTEIN E-PHORESIS SERUM: CPT

## 2024-02-28 PROCEDURE — 36415 COLL VENOUS BLD VENIPUNCTURE: CPT

## 2024-02-28 PROCEDURE — 82784 ASSAY IGA/IGD/IGG/IGM EACH: CPT

## 2024-02-28 PROCEDURE — 85025 COMPLETE CBC W/AUTO DIFF WBC: CPT

## 2024-02-28 PROCEDURE — 80053 COMPREHEN METABOLIC PANEL: CPT

## 2024-02-28 PROCEDURE — 83521 IG LIGHT CHAINS FREE EACH: CPT | Mod: 59

## 2024-02-28 PROCEDURE — 86146 BETA-2 GLYCOPROTEIN ANTIBODY: CPT

## 2024-02-29 LAB
ALBUMIN % SPEP (OHS): 45.82
ALBUMIN SERPL-MCNC: 3.2 G/DL (ref 3.4–4.8)
ALBUMIN/GLOB SERPL: 0.8 RATIO (ref 1.1–2)
ALPHA 1 GLOB (OHS): 0.24 GM/DL
ALPHA 1 GLOB% (OHS): 3.38
ALPHA 2 GLOB % (OHS): 14.51
ALPHA 2 GLOB (OHS): 1.02 GM/DL
BETA GLOB (OHS): 2.13 GM/DL
BETA GLOB% (OHS): 30.46
GAMMA GLOBULIN % (OHS): 5.83
GAMMA GLOBULIN (OHS): 0.41 GM/DL
GLOBULIN SER-MCNC: 3.8 GM/DL (ref 2.4–3.5)
KAPPA LC FREE SER-MCNC: 14.6 MG/DL (ref 0.33–1.94)
KAPPA LC FREE/LAMBDA FREE SER: 13.6 {RATIO} (ref 0.26–1.65)
LAMBDA LC FREE SERPL-MCNC: 1.07 MG/DL (ref 0.57–2.63)
M SPIKE % (OHS): 14.09
M SPIKE (OHS): 0.99 GM/DL
PATH REV: NORMAL
PROT SERPL-MCNC: 7 GM/DL (ref 5.8–7.6)

## 2024-03-04 ENCOUNTER — OFFICE VISIT (OUTPATIENT)
Dept: HEMATOLOGY/ONCOLOGY | Facility: CLINIC | Age: 74
End: 2024-03-04
Payer: MEDICARE

## 2024-03-04 VITALS
HEART RATE: 77 BPM | SYSTOLIC BLOOD PRESSURE: 122 MMHG | BODY MASS INDEX: 44.03 KG/M2 | DIASTOLIC BLOOD PRESSURE: 71 MMHG | OXYGEN SATURATION: 96 % | HEIGHT: 67 IN | WEIGHT: 280.56 LBS | RESPIRATION RATE: 16 BRPM

## 2024-03-04 DIAGNOSIS — E87.5 HYPERKALEMIA: ICD-10-CM

## 2024-03-04 DIAGNOSIS — E83.52 HYPERCALCEMIA: ICD-10-CM

## 2024-03-04 DIAGNOSIS — D47.2 SMOLDERING MULTIPLE MYELOMA (SMM): Primary | ICD-10-CM

## 2024-03-04 LAB
ALBUMIN SERPL-MCNC: 3.9 G/DL (ref 3.4–4.8)
ALBUMIN/GLOB SERPL: 1.1 RATIO (ref 1.1–2)
ALP SERPL-CCNC: 54 UNIT/L (ref 40–150)
ALT SERPL-CCNC: 15 UNIT/L (ref 0–55)
AST SERPL-CCNC: 19 UNIT/L (ref 5–34)
BILIRUB SERPL-MCNC: 0.3 MG/DL
BUN SERPL-MCNC: 19.3 MG/DL (ref 8.4–25.7)
CALCIUM SERPL-MCNC: 10 MG/DL (ref 8.8–10)
CHLORIDE SERPL-SCNC: 109 MMOL/L (ref 98–107)
CO2 SERPL-SCNC: 23 MMOL/L (ref 23–31)
CREAT SERPL-MCNC: 1.04 MG/DL (ref 0.73–1.18)
GFR SERPLBLD CREATININE-BSD FMLA CKD-EPI: >60 MLS/MIN/1.73/M2
GLOBULIN SER-MCNC: 3.5 GM/DL (ref 2.4–3.5)
GLUCOSE SERPL-MCNC: 116 MG/DL (ref 82–115)
POTASSIUM SERPL-SCNC: 4.2 MMOL/L (ref 3.5–5.1)
PROT SERPL-MCNC: 7.4 GM/DL (ref 5.8–7.6)
SODIUM SERPL-SCNC: 142 MMOL/L (ref 136–145)

## 2024-03-04 PROCEDURE — 3074F SYST BP LT 130 MM HG: CPT | Mod: CPTII,S$GLB,, | Performed by: NURSE PRACTITIONER

## 2024-03-04 PROCEDURE — 1160F RVW MEDS BY RX/DR IN RCRD: CPT | Mod: CPTII,S$GLB,, | Performed by: NURSE PRACTITIONER

## 2024-03-04 PROCEDURE — 1101F PT FALLS ASSESS-DOCD LE1/YR: CPT | Mod: CPTII,S$GLB,, | Performed by: NURSE PRACTITIONER

## 2024-03-04 PROCEDURE — 1159F MED LIST DOCD IN RCRD: CPT | Mod: CPTII,S$GLB,, | Performed by: NURSE PRACTITIONER

## 2024-03-04 PROCEDURE — 36415 COLL VENOUS BLD VENIPUNCTURE: CPT | Performed by: NURSE PRACTITIONER

## 2024-03-04 PROCEDURE — 3044F HG A1C LEVEL LT 7.0%: CPT | Mod: CPTII,S$GLB,, | Performed by: NURSE PRACTITIONER

## 2024-03-04 PROCEDURE — 3078F DIAST BP <80 MM HG: CPT | Mod: CPTII,S$GLB,, | Performed by: NURSE PRACTITIONER

## 2024-03-04 PROCEDURE — 99999 PR PBB SHADOW E&M-EST. PATIENT-LVL III: CPT | Mod: PBBFAC,,, | Performed by: NURSE PRACTITIONER

## 2024-03-04 PROCEDURE — 3008F BODY MASS INDEX DOCD: CPT | Mod: CPTII,S$GLB,, | Performed by: NURSE PRACTITIONER

## 2024-03-04 PROCEDURE — 99214 OFFICE O/P EST MOD 30 MIN: CPT | Mod: S$GLB,,, | Performed by: NURSE PRACTITIONER

## 2024-03-04 PROCEDURE — 80053 COMPREHEN METABOLIC PANEL: CPT | Performed by: NURSE PRACTITIONER

## 2024-03-04 PROCEDURE — 3288F FALL RISK ASSESSMENT DOCD: CPT | Mod: CPTII,S$GLB,, | Performed by: NURSE PRACTITIONER

## 2024-03-04 PROCEDURE — 4010F ACE/ARB THERAPY RXD/TAKEN: CPT | Mod: CPTII,S$GLB,, | Performed by: NURSE PRACTITIONER

## 2024-03-04 RX ORDER — CHOLECALCIFEROL (VITAMIN D3) 125 MCG
5000 CAPSULE ORAL
COMMUNITY

## 2024-03-04 RX ORDER — ASPIRIN 325 MG
TABLET, DELAYED RELEASE (ENTERIC COATED) ORAL
COMMUNITY
Start: 2023-12-14

## 2024-03-04 RX ORDER — ROSUVASTATIN CALCIUM 20 MG/1
1 TABLET, COATED ORAL NIGHTLY
COMMUNITY
Start: 2024-02-06

## 2024-03-04 NOTE — PROGRESS NOTES
Subjective:       Patient ID: Fortunato Alcala is a 74 y.o. male.    Chief Complaint: Follow-up (Patient has concerns about right hand )        Diagnosis:  IgG Kappa Multiple Myeloma with 15% plasma cells on bone marrow biopsy.  FISH shows monosomy 13. Clinically consistent with smoldering myeloma.  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 Yuen--he declined the option of plasma exchange.    Low back pain secondary to disk herniation.  Borderline normocytic anemia-- likely anemia of chronic disease (not meeting criteria for end organ anemia for MM)  CKD --thought to be secondary to hypertension and diabetes mellitus rather than myeloma relate    Current Treatment:   Observation for now    Treatment History:  Revlimid 15 mg by mouth daily ×21 days on a every 28 day cycle with weekly dexamethasone 20 mg by mouth weekly x3 Months 4/2016-07/28/16    HPI:  Patient began having blurry vision in 2011, underwent workup by multiple specialists, eventually saw Dr. Gamble early in [place], [place].  He also was seen by Neurology, lumbar puncture done showed presence of IgG monoclonal immunoglobulins.  Serum protein electrophoresis confirmed M spike of 1.0.  Further workup started in June of 2014 revealed an M spike of 1.68, elevated kappa/lambda ratio.  Bone marrow biopsy on 07/15/2014 showed 15% monoclonal kappa plasma cells, fish study showed monosomy 13 (standard risk).  MRI on 08/08/2014 and PET/CT scan on 08/08/2014 showed no evidence of disease.  He was then seen at [facility] in September and November of 2014, recommendation was for observation of smoldering myeloma.  He had a repeat bone marrow biopsy on 09/15/2015 that showed stable plasmacytosis representing 12-16%.  The patient eventually was started on Revlimid 15 mg by mouth daily for 21 out of every 28 days with 20 mg of dexamethasone weekly.  He was treated with this from April of 2016 through  July of 2016. He was then placed back on observation, a repeat bone marrow biopsy done on 06/11/2018 showed no evidence of progression with plasma cells estimated to represent 10-12% of the bone marrow.  He was continued on observation.  Bone marrow biopsy done on 06/07/2023 showed persistent/residual plasma cell myeloma, kappa light chain restricted involving 10-15% of the bone marrow, this is stable from previous.    Interval History:   Patient presents to clinic for scheduled follow up appointment for smoldering myeloma, accompanied by his wife.  Overall he is feeling well. He has been having a flare up of his back pain but denies any new pain.         Past Medical History:   Diagnosis Date    BPH (benign prostatic hyperplasia)     Coronary artery disease     GERD (gastroesophageal reflux disease)     Hypertension     Kidney stones     Mixed hyperlipidemia     Multiple myeloma     Obstructive sleep apnea     Type 2 diabetes mellitus without complications       Past Surgical History:   Procedure Laterality Date    ABDOMINAL AORTIC ANEURYSM REPAIR      BONE MARROW BIOPSY N/A 6/7/2023    Procedure: Biopsy-bone marrow;  Surgeon: Sawyer Pillai MD;  Location: [facility];  Service: General;  Laterality: N/A;    COLONOSCOPY  06/27/2017    KNEE SURGERY      LITHOTRIPSY      SHOULDER SURGERY Left     TRANSFORAMINAL EPIDURAL INJECTION OF STEROID Right 6/16/2023    Procedure: INJECTION, STEROID, EPIDURAL, TRANSFORAMINAL APPROACH (Right L4-5 & L5-S1 TFESI) with fluro;  Surgeon: Rgoer Hauser MD;  Location: [facility] OR;  Service: Pain Management;  Laterality: Right;     Social History     Social History Narrative    ** Merged History Encounter **           Family History   Problem Relation Age of Onset    Anuerysm Mother     Heart disease Father     Anuerysm Father     Heart attack Father     Hypertension Sister       Review of patient's allergies indicates:   Allergen Reactions    Ketorolac      Other reaction(s): SEVERLY NAUSEATED,  SOB    Nitrofurantoin monohyd/m-cryst      Other reaction(s): SEVERLY NAUSEATED    Sulfamethoxazole-trimethoprim Nausea Only     Other reaction(s): SEVERLY NAUSEATED, unknown    Nitrofurantoin Nausea Only      Review of Systems   Constitutional:  Positive for fatigue. Negative for appetite change, chills, diaphoresis, fever and unexpected weight change.   HENT:  Negative for nasal congestion, facial swelling, mouth sores, nosebleeds and sore throat.    Eyes:  Negative for pain and itching.   Respiratory:  Negative for cough, chest tightness and shortness of breath.    Cardiovascular:  Negative for chest pain, palpitations and leg swelling.   Gastrointestinal:  Negative for abdominal distention, abdominal pain, blood in stool, change in bowel habit, nausea and vomiting.   Genitourinary:  Negative for dysuria, frequency, hematuria and urgency.   Musculoskeletal:  Positive for back pain. Negative for arthralgias, joint swelling and myalgias.        Left hip pain from bursitis   Integumentary:  Negative for rash and wound.   Neurological:  Negative for dizziness, weakness, light-headedness and headaches.   Hematological:  Negative for adenopathy. Does not bruise/bleed easily.   Psychiatric/Behavioral:  Negative for agitation and behavioral problems.          Objective:      Physical Exam  Vitals reviewed.   Constitutional:       General: He is awake.      Appearance: Normal appearance.   HENT:      Head: Normocephalic and atraumatic.      Right Ear: Hearing normal.      Left Ear: Hearing normal.      Nose: Nose normal.   Eyes:      General: Lids are normal. Vision grossly intact.      Extraocular Movements: Extraocular movements intact.      Conjunctiva/sclera: Conjunctivae normal.   Cardiovascular:      Rate and Rhythm: Normal rate and regular rhythm.      Pulses: Normal pulses.      Heart sounds: Normal heart sounds.   Pulmonary:      Effort: Pulmonary effort is normal.      Breath sounds: Normal breath sounds. No  wheezing, rhonchi or rales.   Abdominal:      Palpations: Abdomen is soft.   Musculoskeletal:      Cervical back: Full passive range of motion without pain and normal range of motion.      Right lower leg: No edema.      Left lower leg: No edema.   Skin:     General: Skin is warm.   Neurological:      General: No focal deficit present.      Mental Status: He is alert and oriented to person, place, and time.   Psychiatric:         Attention and Perception: Attention normal.         Mood and Affect: Mood and affect normal.         Behavior: Behavior is cooperative.             Assessment:   IgG Kappa smoldering multiple myeloma  Progressive vision loss of unclear etiology - stable   Low back pain secondary to disk herniation.  Borderline normocytic anemia-- likely anemia of chronic disease (not meeting criteria for end organ anemia for MM)  CKD - thought to be secondary to hypertension and diabetes mellitus rather than myeloma relate  Left hip bursitis      Plan:       He had had some worsening of his labs, anemia and kidney function had worsened, calcium was high, m spike had increased.  This led to a bone marrow biopsy being done on 06/07/2023, this showed stable persistent 10-15% involvement by myeloma.    I think patient still meets criteria for smoldering myeloma, we will go back on observation.    Due to slightly high potassium and calcium we will recheck a chemistry today.  Did discuss sources of potassium in his diet and to avoid excessive intake of these.  Also discussed good hydration.    RTC in 6 months with labs prior      **If his myeloma shows signs of progression would consider RVD again followed by maintenance Revlimid.  Due to his intolerance of dexamethasone in the past, we will try to get him off of steroids as soon as possible.    FLAVIA Lerma

## 2024-03-05 ENCOUNTER — TELEPHONE (OUTPATIENT)
Dept: HEMATOLOGY/ONCOLOGY | Facility: CLINIC | Age: 74
End: 2024-03-05
Payer: MEDICARE

## 2024-03-05 DIAGNOSIS — D47.2 SMOLDERING MULTIPLE MYELOMA: Primary | ICD-10-CM

## 2024-03-05 DIAGNOSIS — D47.2 SMOLDERING MULTIPLE MYELOMA: ICD-10-CM

## 2024-03-05 RX ORDER — MUPIROCIN CALCIUM 20 MG/G
CREAM TOPICAL 3 TIMES DAILY
Qty: 15 G | Refills: 0 | Status: SHIPPED | OUTPATIENT
Start: 2024-03-05

## 2024-03-05 RX ORDER — MUPIROCIN 20 MG/G
OINTMENT TOPICAL 3 TIMES DAILY
Qty: 15 G | Refills: 0 | Status: SHIPPED | OUTPATIENT
Start: 2024-03-05 | End: 2024-03-05 | Stop reason: SDUPTHER

## 2024-03-05 RX ORDER — MUPIROCIN 20 MG/G
OINTMENT TOPICAL 3 TIMES DAILY
Qty: 15 G | Refills: 0 | Status: SHIPPED | OUTPATIENT
Start: 2024-03-05

## 2024-03-05 NOTE — TELEPHONE ENCOUNTER
Patient was under the impression that he would be prescribed an abx for his hand. States he likely has staph infection. Was seen in clinic yesterday. Please advise.

## 2024-09-03 ENCOUNTER — TELEPHONE (OUTPATIENT)
Dept: HEMATOLOGY/ONCOLOGY | Facility: CLINIC | Age: 74
End: 2024-09-03
Payer: MEDICARE

## 2024-09-03 DIAGNOSIS — M25.552 LEFT HIP PAIN: ICD-10-CM

## 2024-09-03 DIAGNOSIS — D47.2 SMOLDERING MULTIPLE MYELOMA: Primary | ICD-10-CM

## 2024-09-03 NOTE — TELEPHONE ENCOUNTER
Patient with hx of smoldering myeloma. Complaining of new onset hip, knee and ankle pain on left side. States it started about 4 days ago. Does not recall any injury. States knee seems to be slightly swollen. Having trouble walking, but pain seems to increase the most with lying down. Alleve and Tramadol have not helped with the pain. States it is constant. He is concerned about it potentially be related to the myeloma. Seeing PCP this afternoon. Next visit here is 9/30/24. Please advise.

## 2024-09-23 ENCOUNTER — HOSPITAL ENCOUNTER (OUTPATIENT)
Dept: RADIOLOGY | Facility: HOSPITAL | Age: 74
Discharge: HOME | End: 2024-09-23
Attending: INTERNAL MEDICINE
Payer: MEDICARE

## 2024-09-23 DIAGNOSIS — M25.552 LEFT HIP PAIN: ICD-10-CM

## 2024-09-23 DIAGNOSIS — D47.2 SMOLDERING MULTIPLE MYELOMA: ICD-10-CM

## 2024-09-23 PROCEDURE — 78815 PET IMAGE W/CT SKULL-THIGH: CPT | Mod: TC

## 2024-09-23 PROCEDURE — A9552 F18 FDG: HCPCS | Performed by: INTERNAL MEDICINE

## 2024-09-23 RX ORDER — FLUDEOXYGLUCOSE F18 500 MCI/ML
10 INJECTION INTRAVENOUS
Status: COMPLETED | OUTPATIENT
Start: 2024-09-23 | End: 2024-09-23

## 2024-09-23 RX ADMIN — FLUDEOXYGLUCOSE F-18 9.5 MILLICURIE: 500 INJECTION INTRAVENOUS at 09:09

## 2024-09-24 LAB — POCT GLUCOSE: 97 MG/DL (ref 70–110)

## 2024-09-27 NOTE — PROGRESS NOTES
Subjective:       Patient ID: Fortunato Alcala is a 74 y.o. male.    Chief Complaint: Follow-up (Patient reports joint pain )        Diagnosis:  IgG Kappa Multiple Myeloma with 15% plasma cells on bone marrow biopsy.  FISH shows monosomy 13. Clinically consistent with smoldering myeloma.  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 Yuen--he declined the option of plasma exchange.    Low back pain secondary to disk herniation.  Borderline normocytic anemia-- likely anemia of chronic disease (not meeting criteria for end organ anemia for MM)  CKD --thought to be secondary to hypertension and diabetes mellitus rather than myeloma relate    Current Treatment:   Observation for now    Treatment History:  Revlimid 15 mg by mouth daily ×21 days on a every 28 day cycle with weekly dexamethasone 20 mg by mouth weekly x3 Months 4/2016-07/28/16    HPI:  Patient began having blurry vision in 2011, underwent workup by multiple specialists, eventually saw Dr. Gamble early in [place], [place].  He also was seen by Neurology, lumbar puncture done showed presence of IgG monoclonal immunoglobulins.  Serum protein electrophoresis confirmed M spike of 1.0.  Further workup started in June of 2014 revealed an M spike of 1.68, elevated kappa/lambda ratio.  Bone marrow biopsy on 07/15/2014 showed 15% monoclonal kappa plasma cells, fish study showed monosomy 13 (standard risk).  MRI on 08/08/2014 and PET/CT scan on 08/08/2014 showed no evidence of disease.  He was then seen at [facility] in September and November of 2014, recommendation was for observation of smoldering myeloma.  He had a repeat bone marrow biopsy on 09/15/2015 that showed stable plasmacytosis representing 12-16%.  The patient eventually was started on Revlimid 15 mg by mouth daily for 21 out of every 28 days with 20 mg of dexamethasone weekly.  He was treated with this from April of 2016 through July of  2016. He was then placed back on observation, a repeat bone marrow biopsy done on 06/11/2018 showed no evidence of progression with plasma cells estimated to represent 10-12% of the bone marrow.  He was continued on observation.  Bone marrow biopsy done on 06/07/2023 showed persistent/residual plasma cell myeloma, kappa light chain restricted involving 10-15% of the bone marrow, this is stable from previous.    Left knee X-Ray done on 09/20/2024 Showed mild degenerative disease.    PET/CT scan done on 09/23/2024 Showed soft tissue and cortical hypermetabolism near the left greater trochanter, overall appearance favored to be inflammatory.  Myelomatous involvement was much less likely and there was no other suspicious sites of osseous hypermetabolism.  There was a 3.5 cm linear site of hypermetabolic subpleural consolidation in the left lower lobe, likely atelectasis.    Interval History:   Patient presents to clinic for scheduled follow up appointment for smoldering myeloma, accompanied by his wife.   On 09/03/2024, patient called the office to report new onset hip, knee, and ankle pain on left side.  He was happy that his PET/CT scan does not show evidence of multiple myeloma.  He was interested in having an MRI and following up with an orthopedist.          Past Medical History:   Diagnosis Date    BPH (benign prostatic hyperplasia)     Coronary artery disease     GERD (gastroesophageal reflux disease)     Hypertension     Kidney stones     Mixed hyperlipidemia     Multiple myeloma     Obstructive sleep apnea     Type 2 diabetes mellitus without complications       Past Surgical History:   Procedure Laterality Date    ABDOMINAL AORTIC ANEURYSM REPAIR      BONE MARROW BIOPSY N/A 6/7/2023    Procedure: Biopsy-bone marrow;  Surgeon: Sawyer Pillai MD;  Location: [facility];  Service: General;  Laterality: N/A;    COLONOSCOPY  06/27/2017    KNEE SURGERY      LITHOTRIPSY      SHOULDER SURGERY Left     TRANSFORAMINAL  EPIDURAL INJECTION OF STEROID Right 6/16/2023    Procedure: INJECTION, STEROID, EPIDURAL, TRANSFORAMINAL APPROACH (Right L4-5 & L5-S1 TFESI) with fluro;  Surgeon: Roger Hauser MD;  Location: [facility];  Service: Pain Management;  Laterality: Right;     Social History     Social History Narrative    ** Merged History Encounter **           Family History   Problem Relation Name Age of Onset    Anuerysm Mother      Heart disease Father      Anuerysm Father      Heart attack Father      Hypertension Sister        Review of patient's allergies indicates:   Allergen Reactions    Ketorolac      Other reaction(s): SEVERLY NAUSEATED, SOB    Nitrofurantoin monohyd/m-cryst      Other reaction(s): SEVERLY NAUSEATED    Sulfamethoxazole-trimethoprim Nausea Only     Other reaction(s): SEVERLY NAUSEATED, unknown    Nitrofurantoin Nausea Only      Review of Systems   Constitutional:  Negative for appetite change, chills, diaphoresis, fatigue, fever and unexpected weight change.   HENT:  Negative for nasal congestion, facial swelling, mouth sores, nosebleeds and sore throat.    Eyes:  Negative for pain and itching.   Respiratory:  Negative for cough, chest tightness and shortness of breath.    Cardiovascular:  Negative for chest pain, palpitations and leg swelling.   Gastrointestinal:  Negative for abdominal distention, abdominal pain, blood in stool, change in bowel habit, nausea and vomiting.   Genitourinary:  Negative for dysuria, frequency, hematuria and urgency.   Musculoskeletal:  Positive for back pain. Negative for arthralgias, joint swelling and myalgias.        Left hip pain from bursitis   Integumentary:  Negative for rash and wound.   Neurological:  Negative for dizziness, weakness, light-headedness and headaches.   Hematological:  Negative for adenopathy. Does not bruise/bleed easily.   Psychiatric/Behavioral:  Negative for agitation and behavioral problems.          Objective:      Physical Exam  Vitals reviewed.    Constitutional:       General: He is awake.      Appearance: Normal appearance.   HENT:      Head: Normocephalic and atraumatic.      Right Ear: Hearing normal.      Left Ear: Hearing normal.      Nose: Nose normal.   Eyes:      General: Lids are normal. Vision grossly intact.      Extraocular Movements: Extraocular movements intact.      Conjunctiva/sclera: Conjunctivae normal.   Cardiovascular:      Rate and Rhythm: Normal rate and regular rhythm.      Pulses: Normal pulses.      Heart sounds: Normal heart sounds.   Pulmonary:      Effort: Pulmonary effort is normal.      Breath sounds: Normal breath sounds. No wheezing, rhonchi or rales.   Abdominal:      Palpations: Abdomen is soft.   Musculoskeletal:      Cervical back: Full passive range of motion without pain and normal range of motion.      Right lower leg: No edema.      Left lower leg: No edema.   Skin:     General: Skin is warm.   Neurological:      General: No focal deficit present.      Mental Status: He is alert and oriented to person, place, and time.   Psychiatric:         Attention and Perception: Attention normal.         Mood and Affect: Mood and affect normal.         Behavior: Behavior is cooperative.             Assessment:   IgG Kappa smoldering multiple myeloma  Progressive vision loss of unclear etiology - stable   Low back pain secondary to disk herniation.  Borderline normocytic anemia-- likely anemia of chronic disease (not meeting criteria for end organ anemia for MM)  CKD - thought to be secondary to hypertension and diabetes mellitus rather than myeloma relate  Left hip bursitis      Plan:       He had had some worsening of his labs, anemia and kidney function had worsened, calcium was high, m spike had increased.  This led to a bone marrow biopsy being done on 06/07/2023, this showed stable persistent 10-15% involvement by myeloma.    I think patient still meets criteria for smoldering myeloma, we will go back on observation.    Due  to slightly high potassium and calcium we will recheck a chemistry today.  Did discuss sources of potassium in his diet and to avoid excessive intake of these.  Also discussed good hydration.    Left knee X-Ray done on 09/20/2024 showed degenerative disease.    PET/CT scan done on 09/23/2024 showed likely inflammatory changes of the left hip and some atelectasis, no evidence of myeloma.    I will refer him to pulmonology    MRI of left hip ordered    RTC in 6 months with labs prior    **If his myeloma shows signs of progression would consider RVD again followed by maintenance Revlimid.  Due to his intolerance of dexamethasone in the past, we will try to get him off of steroids as soon as possible.    Hector Snow II, MD

## 2024-09-30 ENCOUNTER — OFFICE VISIT (OUTPATIENT)
Dept: HEMATOLOGY/ONCOLOGY | Facility: CLINIC | Age: 74
End: 2024-09-30
Payer: MEDICARE

## 2024-09-30 VITALS
SYSTOLIC BLOOD PRESSURE: 168 MMHG | RESPIRATION RATE: 18 BRPM | DIASTOLIC BLOOD PRESSURE: 77 MMHG | HEART RATE: 60 BPM | OXYGEN SATURATION: 98 % | BODY MASS INDEX: 44.61 KG/M2 | WEIGHT: 284.81 LBS

## 2024-09-30 DIAGNOSIS — M25.552 LEFT HIP PAIN: ICD-10-CM

## 2024-09-30 DIAGNOSIS — E66.813 CLASS 3 OBESITY: ICD-10-CM

## 2024-09-30 DIAGNOSIS — C90.01 MULTIPLE MYELOMA IN REMISSION: Primary | ICD-10-CM

## 2024-09-30 PROCEDURE — 99214 OFFICE O/P EST MOD 30 MIN: CPT | Mod: S$GLB,,, | Performed by: INTERNAL MEDICINE

## 2024-09-30 PROCEDURE — 3288F FALL RISK ASSESSMENT DOCD: CPT | Mod: CPTII,S$GLB,, | Performed by: INTERNAL MEDICINE

## 2024-09-30 PROCEDURE — 3078F DIAST BP <80 MM HG: CPT | Mod: CPTII,S$GLB,, | Performed by: INTERNAL MEDICINE

## 2024-09-30 PROCEDURE — 1101F PT FALLS ASSESS-DOCD LE1/YR: CPT | Mod: CPTII,S$GLB,, | Performed by: INTERNAL MEDICINE

## 2024-09-30 PROCEDURE — 1160F RVW MEDS BY RX/DR IN RCRD: CPT | Mod: CPTII,S$GLB,, | Performed by: INTERNAL MEDICINE

## 2024-09-30 PROCEDURE — 3044F HG A1C LEVEL LT 7.0%: CPT | Mod: CPTII,S$GLB,, | Performed by: INTERNAL MEDICINE

## 2024-09-30 PROCEDURE — 3008F BODY MASS INDEX DOCD: CPT | Mod: CPTII,S$GLB,, | Performed by: INTERNAL MEDICINE

## 2024-09-30 PROCEDURE — 1159F MED LIST DOCD IN RCRD: CPT | Mod: CPTII,S$GLB,, | Performed by: INTERNAL MEDICINE

## 2024-09-30 PROCEDURE — 4010F ACE/ARB THERAPY RXD/TAKEN: CPT | Mod: CPTII,S$GLB,, | Performed by: INTERNAL MEDICINE

## 2024-09-30 PROCEDURE — 99999 PR PBB SHADOW E&M-EST. PATIENT-LVL V: CPT | Mod: PBBFAC,,, | Performed by: INTERNAL MEDICINE

## 2024-09-30 PROCEDURE — 3077F SYST BP >= 140 MM HG: CPT | Mod: CPTII,S$GLB,, | Performed by: INTERNAL MEDICINE

## 2025-02-06 ENCOUNTER — HOSPITAL ENCOUNTER (OUTPATIENT)
Dept: RADIOLOGY | Facility: HOSPITAL | Age: 75
Discharge: HOME | End: 2025-02-06
Attending: INTERNAL MEDICINE
Payer: MEDICARE

## 2025-02-06 DIAGNOSIS — R91.1 LUNG NODULE: ICD-10-CM

## 2025-02-06 PROCEDURE — 71250 CT THORAX DX C-: CPT | Mod: TC

## 2025-03-25 ENCOUNTER — LAB VISIT (OUTPATIENT)
Dept: LAB | Facility: HOSPITAL | Age: 75
End: 2025-03-25
Attending: INTERNAL MEDICINE
Payer: MEDICARE

## 2025-03-25 DIAGNOSIS — C90.01 MULTIPLE MYELOMA IN REMISSION: ICD-10-CM

## 2025-03-25 LAB
ALBUMIN SERPL-MCNC: 3.7 G/DL (ref 3.4–4.8)
ALBUMIN/GLOB SERPL: 0.9 RATIO (ref 1.1–2)
ALP SERPL-CCNC: 50 UNIT/L (ref 40–150)
ALT SERPL-CCNC: 27 UNIT/L (ref 0–55)
ANION GAP SERPL CALC-SCNC: 9 MEQ/L
AST SERPL-CCNC: 26 UNIT/L (ref 11–45)
B2 MICROGLOB SERPL-MCNC: 2.54 MG/L (ref 0.97–2.64)
BASOPHILS # BLD AUTO: 0.04 X10(3)/MCL
BASOPHILS NFR BLD AUTO: 0.6 %
BILIRUB SERPL-MCNC: 0.4 MG/DL
BUN SERPL-MCNC: 15.8 MG/DL (ref 8.4–25.7)
CALCIUM SERPL-MCNC: 9.7 MG/DL (ref 8.8–10)
CHLORIDE SERPL-SCNC: 110 MMOL/L (ref 98–107)
CO2 SERPL-SCNC: 24 MMOL/L (ref 23–31)
CREAT SERPL-MCNC: 0.96 MG/DL (ref 0.72–1.25)
CREAT/UREA NIT SERPL: 16
EOSINOPHIL # BLD AUTO: 0.08 X10(3)/MCL (ref 0–0.9)
EOSINOPHIL NFR BLD AUTO: 1.1 %
ERYTHROCYTE [DISTWIDTH] IN BLOOD BY AUTOMATED COUNT: 13 % (ref 11.5–17)
GFR SERPLBLD CREATININE-BSD FMLA CKD-EPI: >60 ML/MIN/1.73/M2
GLOBULIN SER-MCNC: 3.9 GM/DL (ref 2.4–3.5)
GLUCOSE SERPL-MCNC: 85 MG/DL (ref 82–115)
HCT VFR BLD AUTO: 39.7 % (ref 42–52)
HGB BLD-MCNC: 13.1 G/DL (ref 14–18)
IGA SERPL-MCNC: 57 MG/DL (ref 101–645)
IGG SERPL-MCNC: 1361 MG/DL (ref 540–1822)
IGM SERPL-MCNC: 24 MG/DL (ref 22–240)
IMM GRANULOCYTES # BLD AUTO: 0.02 X10(3)/MCL (ref 0–0.04)
IMM GRANULOCYTES NFR BLD AUTO: 0.3 %
LYMPHOCYTES # BLD AUTO: 1.65 X10(3)/MCL (ref 0.6–4.6)
LYMPHOCYTES NFR BLD AUTO: 23.7 %
MCH RBC QN AUTO: 30.6 PG (ref 27–31)
MCHC RBC AUTO-ENTMCNC: 33 G/DL (ref 33–36)
MCV RBC AUTO: 92.8 FL (ref 80–94)
MONOCYTES # BLD AUTO: 0.54 X10(3)/MCL (ref 0.1–1.3)
MONOCYTES NFR BLD AUTO: 7.7 %
NEUTROPHILS # BLD AUTO: 4.64 X10(3)/MCL (ref 2.1–9.2)
NEUTROPHILS NFR BLD AUTO: 66.6 %
PLATELET # BLD AUTO: 249 X10(3)/MCL (ref 130–400)
PMV BLD AUTO: 9.3 FL (ref 7.4–10.4)
POTASSIUM SERPL-SCNC: 4.5 MMOL/L (ref 3.5–5.1)
PROT SERPL-MCNC: 7.6 GM/DL (ref 5.8–7.6)
RBC # BLD AUTO: 4.28 X10(6)/MCL (ref 4.7–6.1)
SODIUM SERPL-SCNC: 143 MMOL/L (ref 136–145)
WBC # BLD AUTO: 6.97 X10(3)/MCL (ref 4.5–11.5)

## 2025-03-25 PROCEDURE — 86334 IMMUNOFIX E-PHORESIS SERUM: CPT

## 2025-03-25 PROCEDURE — 36415 COLL VENOUS BLD VENIPUNCTURE: CPT

## 2025-03-25 PROCEDURE — 82784 ASSAY IGA/IGD/IGG/IGM EACH: CPT

## 2025-03-25 PROCEDURE — 85025 COMPLETE CBC W/AUTO DIFF WBC: CPT

## 2025-03-25 PROCEDURE — 86146 BETA-2 GLYCOPROTEIN ANTIBODY: CPT

## 2025-03-25 PROCEDURE — 80053 COMPREHEN METABOLIC PANEL: CPT

## 2025-03-25 PROCEDURE — 83521 IG LIGHT CHAINS FREE EACH: CPT

## 2025-03-25 PROCEDURE — 82232 ASSAY OF BETA-2 PROTEIN: CPT

## 2025-03-26 LAB
KAPPA LC FREE SER NEPH-MCNC: 13.7 MG/DL (ref 0.33–1.94)
KAPPA LC FREE/LAMBDA FREE SER NEPH: 15.2 {RATIO} (ref 0.26–1.65)
LAMBDA LC FREE SERPL-MCNC: 0.9 MG/DL (ref 0.57–2.63)

## 2025-03-27 LAB
ALBUMIN % SPEP (OHS): 46.27 (ref 48.1–59.5)
ALBUMIN SERPL-MCNC: 3.3 G/DL (ref 3.4–4.8)
ALBUMIN/GLOB SERPL: 0.8 RATIO (ref 1.1–2)
ALPHA 1 GLOB (OHS): 0.27 GM/DL (ref 0–0.4)
ALPHA 1 GLOB% (OHS): 3.76 (ref 2.3–4.9)
ALPHA 2 GLOB % (OHS): 15.76 (ref 6.9–13)
ALPHA 2 GLOB (OHS): 1.13 GM/DL (ref 0.4–1)
BETA GLOB (OHS): 2.1 GM/DL (ref 0.7–1.3)
BETA GLOB% (OHS): 29.22 (ref 13.8–19.7)
GAMMA GLOBULIN % (OHS): 4.99 (ref 10.1–21.9)
GAMMA GLOBULIN (OHS): 0.36 GM/DL (ref 0.4–1.8)
GLOBULIN SER-MCNC: 3.9 GM/DL (ref 2.4–3.5)
M SPIKE % (OHS): 13.31
M SPIKE (OHS): 0.96 GM/DL
PATH REV: NORMAL
PROT SERPL-MCNC: 7.2 GM/DL (ref 5.8–7.6)

## 2025-04-01 ENCOUNTER — OFFICE VISIT (OUTPATIENT)
Dept: HEMATOLOGY/ONCOLOGY | Facility: CLINIC | Age: 75
End: 2025-04-01
Payer: MEDICARE

## 2025-04-01 VITALS
BODY MASS INDEX: 44.63 KG/M2 | WEIGHT: 284.38 LBS | OXYGEN SATURATION: 99 % | SYSTOLIC BLOOD PRESSURE: 127 MMHG | RESPIRATION RATE: 18 BRPM | HEART RATE: 67 BPM | DIASTOLIC BLOOD PRESSURE: 77 MMHG | HEIGHT: 67 IN

## 2025-04-01 DIAGNOSIS — M54.2 NECK PAIN: ICD-10-CM

## 2025-04-01 DIAGNOSIS — D64.9 ANEMIA, UNSPECIFIED TYPE: ICD-10-CM

## 2025-04-01 DIAGNOSIS — D47.2 SMOLDERING MYELOMA: Primary | ICD-10-CM

## 2025-04-01 PROCEDURE — 99215 OFFICE O/P EST HI 40 MIN: CPT | Mod: S$GLB,,, | Performed by: NURSE PRACTITIONER

## 2025-04-01 PROCEDURE — 3288F FALL RISK ASSESSMENT DOCD: CPT | Mod: CPTII,S$GLB,, | Performed by: NURSE PRACTITIONER

## 2025-04-01 PROCEDURE — 3074F SYST BP LT 130 MM HG: CPT | Mod: CPTII,S$GLB,, | Performed by: NURSE PRACTITIONER

## 2025-04-01 PROCEDURE — 1101F PT FALLS ASSESS-DOCD LE1/YR: CPT | Mod: CPTII,S$GLB,, | Performed by: NURSE PRACTITIONER

## 2025-04-01 PROCEDURE — 1160F RVW MEDS BY RX/DR IN RCRD: CPT | Mod: CPTII,S$GLB,, | Performed by: NURSE PRACTITIONER

## 2025-04-01 PROCEDURE — 1159F MED LIST DOCD IN RCRD: CPT | Mod: CPTII,S$GLB,, | Performed by: NURSE PRACTITIONER

## 2025-04-01 PROCEDURE — 99999 PR PBB SHADOW E&M-EST. PATIENT-LVL V: CPT | Mod: PBBFAC,,, | Performed by: NURSE PRACTITIONER

## 2025-04-01 PROCEDURE — G2211 COMPLEX E/M VISIT ADD ON: HCPCS | Mod: S$GLB,,, | Performed by: NURSE PRACTITIONER

## 2025-04-01 PROCEDURE — 3078F DIAST BP <80 MM HG: CPT | Mod: CPTII,S$GLB,, | Performed by: NURSE PRACTITIONER

## 2025-04-01 PROCEDURE — 4010F ACE/ARB THERAPY RXD/TAKEN: CPT | Mod: CPTII,S$GLB,, | Performed by: NURSE PRACTITIONER

## 2025-04-01 PROCEDURE — 3008F BODY MASS INDEX DOCD: CPT | Mod: CPTII,S$GLB,, | Performed by: NURSE PRACTITIONER

## 2025-04-01 RX ORDER — FLUTICASONE PROPIONATE 50 MCG
SPRAY, SUSPENSION (ML) NASAL
COMMUNITY

## 2025-04-01 RX ORDER — PREGABALIN 75 MG/1
75 CAPSULE ORAL 2 TIMES DAILY
COMMUNITY

## 2025-04-01 RX ORDER — VIT C/E/ZN/COPPR/LUTEIN/ZEAXAN 250MG-90MG
1 CAPSULE ORAL EVERY MORNING
COMMUNITY

## 2025-04-01 NOTE — PROGRESS NOTES
Subjective:       Patient ID: Fortunato Alcala is a 75 y.o. male.    Chief Complaint: Follow-up (Patient reports pain in bones)        Diagnosis:  IgG Kappa Multiple Myeloma with 15% plasma cells on bone marrow biopsy.  FISH shows monosomy 13. Clinically consistent with smoldering myeloma.  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 Yuen--he declined the option of plasma exchange.    Low back pain secondary to disk herniation.  Borderline normocytic anemia-- likely anemia of chronic disease (not meeting criteria for end organ anemia for MM)  CKD --thought to be secondary to hypertension and diabetes mellitus rather than myeloma relate    Current Treatment:   Observation for now    Treatment History:  Revlimid 15 mg by mouth daily ×21 days on a every 28 day cycle with weekly dexamethasone 20 mg by mouth weekly x3 Months 4/2016-07/28/16    HPI:  Patient began having blurry vision in 2011, underwent workup by multiple specialists, eventually saw Dr. Gamble early in [place], [place].  He also was seen by Neurology, lumbar puncture done showed presence of IgG monoclonal immunoglobulins.  Serum protein electrophoresis confirmed M spike of 1.0.  Further workup started in June of 2014 revealed an M spike of 1.68, elevated kappa/lambda ratio.  Bone marrow biopsy on 07/15/2014 showed 15% monoclonal kappa plasma cells, fish study showed monosomy 13 (standard risk).  MRI on 08/08/2014 and PET/CT scan on 08/08/2014 showed no evidence of disease.  He was then seen at [facility] in September and November of 2014, recommendation was for observation of smoldering myeloma.  He had a repeat bone marrow biopsy on 09/15/2015 that showed stable plasmacytosis representing 12-16%.  The patient eventually was started on Revlimid 15 mg by mouth daily for 21 out of every 28 days with 20 mg of dexamethasone weekly.  He was treated with this from April of 2016 through July of  2016. He was then placed back on observation, a repeat bone marrow biopsy done on 06/11/2018 showed no evidence of progression with plasma cells estimated to represent 10-12% of the bone marrow.  He was continued on observation.  Bone marrow biopsy done on 06/07/2023 showed persistent/residual plasma cell myeloma, kappa light chain restricted involving 10-15% of the bone marrow, this is stable from previous.    Left knee X-Ray done on 09/20/2024 Showed mild degenerative disease.    PET/CT scan done on 09/23/2024 Showed soft tissue and cortical hypermetabolism near the left greater trochanter, overall appearance favored to be inflammatory.  Myelomatous involvement was much less likely and there was no other suspicious sites of osseous hypermetabolism.  There was a 3.5 cm linear site of hypermetabolic subpleural consolidation in the left lower lobe, likely atelectasis.    Interval History:   Patient presents to clinic for scheduled follow up appointment for smoldering myeloma, accompanied by his wife.  Feels well overall other than bone pain.  He does have some widespread arthritis but has noticed a new neck pain as well as left upper knee pain over the past month or so.  He does take approximately 1 ibuprofen per day for his pain but this does not really control it.        Past Medical History:   Diagnosis Date    BPH (benign prostatic hyperplasia)     Coronary artery disease     GERD (gastroesophageal reflux disease)     Hypertension     Kidney stones     Mixed hyperlipidemia     Multiple myeloma     Obstructive sleep apnea     Type 2 diabetes mellitus without complications       Past Surgical History:   Procedure Laterality Date    ABDOMINAL AORTIC ANEURYSM REPAIR      BONE MARROW BIOPSY N/A 6/7/2023    Procedure: Biopsy-bone marrow;  Surgeon: Sawyer Pillai MD;  Location: [facility];  Service: General;  Laterality: N/A;    COLONOSCOPY  06/27/2017    KNEE SURGERY      LITHOTRIPSY      SHOULDER SURGERY Left      TRANSFORAMINAL EPIDURAL INJECTION OF STEROID Right 6/16/2023    Procedure: INJECTION, STEROID, EPIDURAL, TRANSFORAMINAL APPROACH (Right L4-5 & L5-S1 TFESI) with fluro;  Surgeon: Roger Hauser MD;  Location: [facility];  Service: Pain Management;  Laterality: Right;     Social History     Social History Narrative    ** Merged History Encounter **           Family History   Problem Relation Name Age of Onset    Anuerysm Mother      Heart disease Father      Anuerysm Father      Heart attack Father      Hypertension Sister        Review of patient's allergies indicates:   Allergen Reactions    Nitrofurantoin monohyd/m-cryst      Other reaction(s): SEVERLY NAUSEATED    Sulfamethoxazole-trimethoprim Nausea Only     Other reaction(s): SEVERLY NAUSEATED, unknown    Toradol [ketorolac] Nausea Only     Other reaction(s): SEVERLY NAUSEATED, SOB    Nitrofurantoin Nausea Only      Review of Systems   Constitutional:  Negative for appetite change, chills, diaphoresis, fatigue, fever and unexpected weight change.   HENT:  Negative for nasal congestion, facial swelling, mouth sores, nosebleeds and sore throat.    Eyes:  Negative for pain, itching and visual disturbance.   Respiratory:  Negative for cough, chest tightness and shortness of breath.    Cardiovascular:  Negative for chest pain, palpitations and leg swelling.   Gastrointestinal:  Negative for abdominal distention, abdominal pain, blood in stool, change in bowel habit, diarrhea, nausea and vomiting.   Genitourinary:  Negative for dysuria, frequency, hematuria and urgency.   Musculoskeletal:  Positive for back pain. Negative for arthralgias, joint swelling and myalgias.        Left hip pain from bursitis   Integumentary:  Negative for rash and wound.   Neurological:  Negative for dizziness, weakness, light-headedness and headaches.   Hematological:  Negative for adenopathy. Does not bruise/bleed easily.   Psychiatric/Behavioral:  Negative for agitation and behavioral  problems. The patient is nervous/anxious.          Objective:      Physical Exam  Vitals reviewed.   Constitutional:       General: He is awake.      Appearance: Normal appearance.   HENT:      Head: Normocephalic and atraumatic.      Right Ear: Hearing normal.      Left Ear: Hearing normal.      Nose: Nose normal.   Eyes:      General: Lids are normal. Vision grossly intact.      Extraocular Movements: Extraocular movements intact.      Conjunctiva/sclera: Conjunctivae normal.   Cardiovascular:      Rate and Rhythm: Normal rate and regular rhythm.      Pulses: Normal pulses.      Heart sounds: Normal heart sounds.   Pulmonary:      Effort: Pulmonary effort is normal.      Breath sounds: Normal breath sounds. No wheezing, rhonchi or rales.   Musculoskeletal:      Cervical back: Full passive range of motion without pain and normal range of motion.      Right lower leg: No edema.      Left lower leg: No edema.   Skin:     General: Skin is warm.   Neurological:      General: No focal deficit present.      Mental Status: He is alert and oriented to person, place, and time.   Psychiatric:         Attention and Perception: Attention normal.         Mood and Affect: Mood and affect normal.         Behavior: Behavior is cooperative.             Assessment:   IgG Kappa smoldering multiple myeloma  Progressive vision loss of unclear etiology - stable   Low back pain secondary to disk herniation.  Borderline normocytic anemia-- likely anemia of chronic disease (not meeting criteria for end organ anemia for MM)  CKD - thought to be secondary to hypertension and diabetes mellitus rather than myeloma relate  Left hip bursitis      Plan:       He had had some worsening of his labs, anemia and kidney function had worsened, calcium was high, m spike had increased.  This led to a bone marrow biopsy being done on 06/07/2023, this showed stable persistent 10-15% involvement by myeloma.    I think patient still meets criteria for  smoldering myeloma, we will go back on observation.    Left knee X-Ray done on 09/20/2024 showed degenerative disease.    PET/CT scan done on 09/23/2024 showed likely inflammatory changes of the left hip and some atelectasis, no evidence of myeloma.    Now sees pulmonology    Due to his diagnosis of smoldering myeloma and new persistent pain in the neck and left knee, we will get a PET scan and call with results.      RTC in 3 months with labs prior.  We will include anemia workup with his next labs though this is likely related to his smoldering myeloma.    **If his myeloma shows signs of progression would consider RVD again followed by maintenance Revlimid.  Due to his intolerance of dexamethasone in the past, we will try to get him off of steroids as soon as possible.    Monika Ayala, SANDYP-C    Visit today included increased complexity associated with the care of the episodic problem smoldering myeloma, addressing and managing the longitudinal care of the patient's smoldering myeloma.

## 2025-06-25 NOTE — PROGRESS NOTES
Subjective:       Patient ID: Fortunato Alcala is a 75 y.o. male.    Chief Complaint: Follow-up (Patient reports SOB)      Diagnosis:  IgG Kappa Multiple Myeloma with 15% plasma cells on bone marrow biopsy.  FISH shows monosomy 13. Clinically consistent with smoldering myeloma.  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 Yuen--he declined the option of plasma exchange.    Low back pain secondary to disk herniation.  Borderline normocytic anemia-- likely anemia of chronic disease (not meeting criteria for end organ anemia for MM)  CKD --thought to be secondary to hypertension and diabetes mellitus rather than myeloma relate    Current Treatment:   Observation for now    Treatment History:  Revlimid 15 mg by mouth daily ×21 days on a every 28 day cycle with weekly dexamethasone 20 mg by mouth weekly x3 Months 4/2016-07/28/16    HPI:  Patient began having blurry vision in 2011, underwent workup by multiple specialists, eventually saw Dr. Gamble early in [place], [place].  He also was seen by Neurology, lumbar puncture done showed presence of IgG monoclonal immunoglobulins.  Serum protein electrophoresis confirmed M spike of 1.0.  Further workup started in June of 2014 revealed an M spike of 1.68, elevated kappa/lambda ratio.  Bone marrow biopsy on 07/15/2014 showed 15% monoclonal kappa plasma cells, fish study showed monosomy 13 (standard risk).  MRI on 08/08/2014 and PET/CT scan on 08/08/2014 showed no evidence of disease.  He was then seen at [facility] in September and November of 2014, recommendation was for observation of smoldering myeloma.  He had a repeat bone marrow biopsy on 09/15/2015 that showed stable plasmacytosis representing 12-16%.  The patient eventually was started on Revlimid 15 mg by mouth daily for 21 out of every 28 days with 20 mg of dexamethasone weekly.  He was treated with this from April of 2016 through July of 2016. He was  then placed back on observation, a repeat bone marrow biopsy done on 06/11/2018 showed no evidence of progression with plasma cells estimated to represent 10-12% of the bone marrow.  He was continued on observation.  Bone marrow biopsy done on 06/07/2023 showed persistent/residual plasma cell myeloma, kappa light chain restricted involving 10-15% of the bone marrow, this is stable from previous.    Left knee X-Ray done on 09/20/2024 Showed mild degenerative disease.    PET/CT scan done on 09/23/2024 Showed soft tissue and cortical hypermetabolism near the left greater trochanter, overall appearance favored to be inflammatory.  Myelomatous involvement was much less likely and there was no other suspicious sites of osseous hypermetabolism.  There was a 3.5 cm linear site of hypermetabolic subpleural consolidation in the left lower lobe, likely atelectasis.    Most recent PET/CT scan on 06/26/2025 showed no FDG avid disease with a nonspecific 5 mm right upper lobe nodule.      Interval History:   Patient presents to clinic for scheduled follow up appointment for smoldering myeloma, accompanied by his wife.  He does have some shortness of breath for which he recently saw pulmonology.  He has diffuse joint pain likely from arthritis.  Also, recently started on semaglutide.      Past Medical History:   Diagnosis Date    BPH (benign prostatic hyperplasia)     Coronary artery disease     GERD (gastroesophageal reflux disease)     Hypertension     Kidney stones     Mixed hyperlipidemia     Multiple myeloma     Obstructive sleep apnea     Type 2 diabetes mellitus without complications       Past Surgical History:   Procedure Laterality Date    ABDOMINAL AORTIC ANEURYSM REPAIR      BONE MARROW BIOPSY N/A 6/7/2023    Procedure: Biopsy-bone marrow;  Surgeon: Sawyer Pillai MD;  Location: [facility];  Service: General;  Laterality: N/A;    COLONOSCOPY  06/27/2017    KNEE SURGERY      LITHOTRIPSY      SHOULDER SURGERY Left      TRANSFORAMINAL EPIDURAL INJECTION OF STEROID Right 6/16/2023    Procedure: INJECTION, STEROID, EPIDURAL, TRANSFORAMINAL APPROACH (Right L4-5 & L5-S1 TFESI) with fluro;  Surgeon: Roger Hauser MD;  Location: [facility];  Service: Pain Management;  Laterality: Right;     Social History     Social History Narrative    ** Merged History Encounter **           Family History   Problem Relation Name Age of Onset    Anuerysm Mother      Heart disease Father      Anuerysm Father      Heart attack Father      Hypertension Sister        Review of patient's allergies indicates:   Allergen Reactions    Nitrofurantoin monohyd/m-cryst      Other reaction(s): SEVERLY NAUSEATED    Sulfamethoxazole-trimethoprim Nausea Only     Other reaction(s): SEVERLY NAUSEATED, unknown    Toradol [ketorolac] Nausea Only     Other reaction(s): SEVERLY NAUSEATED, SOB    Nitrofurantoin Nausea Only      Review of Systems   Constitutional:  Negative for appetite change and unexpected weight change.   HENT:  Negative for mouth sores.    Eyes:  Positive for visual disturbance.   Respiratory:  Positive for cough and shortness of breath.    Cardiovascular:  Negative for chest pain.   Gastrointestinal:  Positive for diarrhea. Negative for abdominal pain.   Genitourinary:  Positive for frequency.   Musculoskeletal:  Positive for back pain.   Integumentary:  Negative for rash.   Neurological:  Negative for headaches.   Hematological:  Negative for adenopathy.   Psychiatric/Behavioral:  The patient is nervous/anxious.          Objective:      Physical Exam  Vitals reviewed.   Constitutional:       General: He is awake.      Appearance: Normal appearance.   HENT:      Head: Normocephalic and atraumatic.      Right Ear: Hearing normal.      Left Ear: Hearing normal.      Nose: Nose normal.   Eyes:      General: Lids are normal. Vision grossly intact.      Extraocular Movements: Extraocular movements intact.      Conjunctiva/sclera: Conjunctivae normal.    Cardiovascular:      Rate and Rhythm: Normal rate and regular rhythm.      Pulses: Normal pulses.      Heart sounds: Normal heart sounds.   Pulmonary:      Effort: Pulmonary effort is normal.      Breath sounds: Normal breath sounds. No wheezing, rhonchi or rales.   Musculoskeletal:      Cervical back: Full passive range of motion without pain and normal range of motion.      Right lower leg: No edema.      Left lower leg: No edema.   Skin:     General: Skin is warm.   Neurological:      General: No focal deficit present.      Mental Status: He is alert and oriented to person, place, and time.   Psychiatric:         Attention and Perception: Attention normal.         Mood and Affect: Mood and affect normal.         Behavior: Behavior is cooperative.             Assessment:   IgG Kappa smoldering multiple myeloma  Progressive vision loss of unclear etiology - stable   Low back pain secondary to disk herniation.  Borderline normocytic anemia-- likely anemia of chronic disease (not meeting criteria for end organ anemia for MM)  CKD - thought to be secondary to hypertension and diabetes mellitus rather than myeloma relate  Left hip bursitis      Plan:       He had had some worsening of his labs, anemia and kidney function had worsened, calcium was high, m spike had increased.  This led to a bone marrow biopsy being done on 06/07/2023, this showed stable persistent 10-15% involvement by myeloma.    I think patient still meets criteria for smoldering myeloma, we will go back on observation.    Left knee X-Ray done on 09/20/2024 showed degenerative disease.    PET/CT scan done on 09/23/2024 showed likely inflammatory changes of the left hip and some atelectasis, no evidence of myeloma.    Now sees pulmonology    Due to his diagnosis of smoldering myeloma and new persistent pain in the neck and left knee, we will get a PET scan.    PET/CT scan on 06/26/2025 showed no FDG avid disease.      RTC in 3 months with labs prior  and CT chest prior    **If his myeloma shows signs of progression would consider RVD again followed by maintenance Revlimid.  Due to his intolerance of dexamethasone in the past, we will try to get him off of steroids as soon as possible.    Visit today included increased complexity associated with the care of the episodic problem smoldering myeloma, addressing and managing the longitudinal care of the patient's smoldering myeloma.      Hector Snow II, MD

## 2025-06-26 ENCOUNTER — HOSPITAL ENCOUNTER (OUTPATIENT)
Dept: RADIOLOGY | Facility: HOSPITAL | Age: 75
Discharge: HOME | End: 2025-06-26
Attending: NURSE PRACTITIONER
Payer: MEDICARE

## 2025-06-26 DIAGNOSIS — D47.2 SMOLDERING MYELOMA: ICD-10-CM

## 2025-06-26 DIAGNOSIS — D64.9 ANEMIA, UNSPECIFIED TYPE: ICD-10-CM

## 2025-06-26 DIAGNOSIS — M54.2 NECK PAIN: ICD-10-CM

## 2025-06-26 LAB — POCT GLUCOSE: 118 MG/DL (ref 70–110)

## 2025-06-26 PROCEDURE — 78815 PET IMAGE W/CT SKULL-THIGH: CPT | Mod: TC

## 2025-06-26 PROCEDURE — A9552 F18 FDG: HCPCS | Performed by: NURSE PRACTITIONER

## 2025-06-26 RX ORDER — FLUDEOXYGLUCOSE F18 500 MCI/ML
10 INJECTION INTRAVENOUS
Status: COMPLETED | OUTPATIENT
Start: 2025-06-26 | End: 2025-06-26

## 2025-06-26 RX ADMIN — FLUDEOXYGLUCOSE F-18 11 MILLICURIE: 500 INJECTION INTRAVENOUS at 08:06

## 2025-07-01 ENCOUNTER — OFFICE VISIT (OUTPATIENT)
Dept: HEMATOLOGY/ONCOLOGY | Facility: CLINIC | Age: 75
End: 2025-07-01
Payer: MEDICARE

## 2025-07-01 VITALS
DIASTOLIC BLOOD PRESSURE: 90 MMHG | OXYGEN SATURATION: 99 % | RESPIRATION RATE: 18 BRPM | SYSTOLIC BLOOD PRESSURE: 134 MMHG | HEART RATE: 80 BPM | WEIGHT: 288.81 LBS | BODY MASS INDEX: 45.23 KG/M2

## 2025-07-01 DIAGNOSIS — R91.1 LUNG NODULE: ICD-10-CM

## 2025-07-01 DIAGNOSIS — D47.2 SMOLDERING MYELOMA: Primary | ICD-10-CM

## 2025-07-01 PROCEDURE — 3066F NEPHROPATHY DOC TX: CPT | Mod: CPTII,S$GLB,, | Performed by: INTERNAL MEDICINE

## 2025-07-01 PROCEDURE — 3062F POS MACROALBUMINURIA REV: CPT | Mod: CPTII,S$GLB,, | Performed by: INTERNAL MEDICINE

## 2025-07-01 PROCEDURE — G2211 COMPLEX E/M VISIT ADD ON: HCPCS | Mod: S$GLB,,, | Performed by: INTERNAL MEDICINE

## 2025-07-01 PROCEDURE — 99214 OFFICE O/P EST MOD 30 MIN: CPT | Mod: S$GLB,,, | Performed by: INTERNAL MEDICINE

## 2025-07-01 PROCEDURE — 4010F ACE/ARB THERAPY RXD/TAKEN: CPT | Mod: CPTII,S$GLB,, | Performed by: INTERNAL MEDICINE

## 2025-07-01 PROCEDURE — 3008F BODY MASS INDEX DOCD: CPT | Mod: CPTII,S$GLB,, | Performed by: INTERNAL MEDICINE

## 2025-07-01 PROCEDURE — 3075F SYST BP GE 130 - 139MM HG: CPT | Mod: CPTII,S$GLB,, | Performed by: INTERNAL MEDICINE

## 2025-07-01 PROCEDURE — 3044F HG A1C LEVEL LT 7.0%: CPT | Mod: CPTII,S$GLB,, | Performed by: INTERNAL MEDICINE

## 2025-07-01 PROCEDURE — 99999 PR PBB SHADOW E&M-EST. PATIENT-LVL V: CPT | Mod: PBBFAC,,, | Performed by: INTERNAL MEDICINE

## 2025-07-01 PROCEDURE — 1160F RVW MEDS BY RX/DR IN RCRD: CPT | Mod: CPTII,S$GLB,, | Performed by: INTERNAL MEDICINE

## 2025-07-01 PROCEDURE — 3080F DIAST BP >= 90 MM HG: CPT | Mod: CPTII,S$GLB,, | Performed by: INTERNAL MEDICINE

## 2025-07-01 PROCEDURE — 1159F MED LIST DOCD IN RCRD: CPT | Mod: CPTII,S$GLB,, | Performed by: INTERNAL MEDICINE

## 2025-07-01 RX ORDER — AZELASTINE 1 MG/ML
1 SPRAY, METERED NASAL
COMMUNITY
Start: 2025-05-21 | End: 2026-05-21

## 2025-07-02 ENCOUNTER — PATIENT MESSAGE (OUTPATIENT)
Dept: HEMATOLOGY/ONCOLOGY | Facility: CLINIC | Age: 75
End: 2025-07-02
Payer: MEDICARE

## 2025-07-02 DIAGNOSIS — C90.00 MULTIPLE MYELOMA, REMISSION STATUS UNSPECIFIED: Primary | ICD-10-CM

## 2025-08-22 ENCOUNTER — LAB VISIT (OUTPATIENT)
Dept: LAB | Facility: HOSPITAL | Age: 75
End: 2025-08-22
Attending: INTERNAL MEDICINE
Payer: MEDICARE

## 2025-08-22 DIAGNOSIS — E78.2 MIXED HYPERLIPIDEMIA: ICD-10-CM

## 2025-08-22 DIAGNOSIS — I10 ESSENTIAL HYPERTENSION, MALIGNANT: Primary | ICD-10-CM

## 2025-08-22 LAB
ANION GAP SERPL CALC-SCNC: 14 MEQ/L
BUN SERPL-MCNC: 44.8 MG/DL (ref 8.4–25.7)
CALCIUM SERPL-MCNC: 10.6 MG/DL (ref 8.8–10)
CHLORIDE SERPL-SCNC: 107 MMOL/L (ref 98–107)
CO2 SERPL-SCNC: 25 MMOL/L (ref 23–31)
CREAT SERPL-MCNC: 1.74 MG/DL (ref 0.72–1.25)
CREAT/UREA NIT SERPL: 26
GFR SERPLBLD CREATININE-BSD FMLA CKD-EPI: 41 ML/MIN/1.73/M2
GLUCOSE SERPL-MCNC: 102 MG/DL (ref 82–115)
POTASSIUM SERPL-SCNC: 4.9 MMOL/L (ref 3.5–5.1)
SODIUM SERPL-SCNC: 146 MMOL/L (ref 136–145)

## 2025-08-22 PROCEDURE — 80048 BASIC METABOLIC PNL TOTAL CA: CPT

## 2025-08-22 PROCEDURE — 36415 COLL VENOUS BLD VENIPUNCTURE: CPT

## (undated) DEVICE — SYR EPILOR LUER-LOK LOR 7ML

## (undated) DEVICE — TRAY SINGLE DOSE EPIDURAL

## (undated) DEVICE — GLOVE PROTEXIS HYDROGEL SZ7.5

## (undated) DEVICE — TOWEL OR BLUE STRL 16X26 4/PK

## (undated) DEVICE — GLOVE PROTEXIS HYDROGEL SZ6.5

## (undated) DEVICE — APPLICATOR CHLORAPREP ORN 26ML

## (undated) DEVICE — SET IV EXT GENERAL 4.9ML 30IN

## (undated) DEVICE — BANDAID GARFIELD

## (undated) DEVICE — CONTRAST ISOVUE M 300 15ML VIL

## (undated) DEVICE — SYR DISP LL 5CC